# Patient Record
Sex: FEMALE | Race: WHITE | NOT HISPANIC OR LATINO
[De-identification: names, ages, dates, MRNs, and addresses within clinical notes are randomized per-mention and may not be internally consistent; named-entity substitution may affect disease eponyms.]

---

## 2017-07-10 ENCOUNTER — APPOINTMENT (OUTPATIENT)
Dept: INTERNAL MEDICINE | Facility: CLINIC | Age: 67
End: 2017-07-10

## 2017-07-10 VITALS
HEIGHT: 65 IN | SYSTOLIC BLOOD PRESSURE: 130 MMHG | TEMPERATURE: 98 F | OXYGEN SATURATION: 98 % | WEIGHT: 165 LBS | BODY MASS INDEX: 27.49 KG/M2 | DIASTOLIC BLOOD PRESSURE: 80 MMHG | HEART RATE: 63 BPM

## 2017-07-18 ENCOUNTER — FORM ENCOUNTER (OUTPATIENT)
Age: 67
End: 2017-07-18

## 2017-07-19 ENCOUNTER — OUTPATIENT (OUTPATIENT)
Dept: OUTPATIENT SERVICES | Facility: HOSPITAL | Age: 67
LOS: 1 days | End: 2017-07-19
Payer: MEDICARE

## 2017-07-19 ENCOUNTER — APPOINTMENT (OUTPATIENT)
Dept: ULTRASOUND IMAGING | Facility: IMAGING CENTER | Age: 67
End: 2017-07-19

## 2017-07-19 DIAGNOSIS — Z00.8 ENCOUNTER FOR OTHER GENERAL EXAMINATION: ICD-10-CM

## 2017-07-19 PROCEDURE — 76700 US EXAM ABDOM COMPLETE: CPT

## 2017-07-26 VITALS — DIASTOLIC BLOOD PRESSURE: 80 MMHG | SYSTOLIC BLOOD PRESSURE: 135 MMHG

## 2017-07-26 LAB
ALBUMIN SERPL ELPH-MCNC: 4.4 G/DL
ALP BLD-CCNC: 94 U/L
ALT SERPL-CCNC: 9 U/L
AMYLASE/CREAT SERPL: 70 U/L
ANION GAP SERPL CALC-SCNC: 17 MMOL/L
AST SERPL-CCNC: 19 U/L
BASOPHILS # BLD AUTO: 0.02 K/UL
BASOPHILS NFR BLD AUTO: 0.2 %
BILIRUB SERPL-MCNC: 0.3 MG/DL
BUN SERPL-MCNC: 18 MG/DL
CALCIUM SERPL-MCNC: 10 MG/DL
CHLORIDE SERPL-SCNC: 101 MMOL/L
CO2 SERPL-SCNC: 22 MMOL/L
CREAT SERPL-MCNC: 0.89 MG/DL
EOSINOPHIL # BLD AUTO: 0.1 K/UL
EOSINOPHIL NFR BLD AUTO: 1.2 %
GLUCOSE SERPL-MCNC: 81 MG/DL
HCT VFR BLD CALC: 45.2 %
HGB BLD-MCNC: 14.9 G/DL
IMM GRANULOCYTES NFR BLD AUTO: 0.1 %
LPL SERPL-CCNC: 84 U/L
LYMPHOCYTES # BLD AUTO: 1.88 K/UL
LYMPHOCYTES NFR BLD AUTO: 22.5 %
MAN DIFF?: NORMAL
MCHC RBC-ENTMCNC: 32 PG
MCHC RBC-ENTMCNC: 33 GM/DL
MCV RBC AUTO: 97 FL
MONOCYTES # BLD AUTO: 0.59 K/UL
MONOCYTES NFR BLD AUTO: 7.1 %
NEUTROPHILS # BLD AUTO: 5.75 K/UL
NEUTROPHILS NFR BLD AUTO: 68.9 %
PLATELET # BLD AUTO: 339 K/UL
POTASSIUM SERPL-SCNC: 4.9 MMOL/L
PROT SERPL-MCNC: 7.8 G/DL
RBC # BLD: 4.66 M/UL
RBC # FLD: 14.3 %
SODIUM SERPL-SCNC: 140 MMOL/L
WBC # FLD AUTO: 8.35 K/UL

## 2017-08-12 ENCOUNTER — RX RENEWAL (OUTPATIENT)
Age: 67
End: 2017-08-12

## 2017-09-07 ENCOUNTER — NON-APPOINTMENT (OUTPATIENT)
Age: 67
End: 2017-09-07

## 2017-09-07 ENCOUNTER — APPOINTMENT (OUTPATIENT)
Dept: INTERNAL MEDICINE | Facility: CLINIC | Age: 67
End: 2017-09-07
Payer: MEDICARE

## 2017-09-07 VITALS
WEIGHT: 160 LBS | HEART RATE: 99 BPM | TEMPERATURE: 98.3 F | OXYGEN SATURATION: 92 % | HEIGHT: 65 IN | BODY MASS INDEX: 26.66 KG/M2

## 2017-09-07 DIAGNOSIS — Z87.19 PERSONAL HISTORY OF OTHER DISEASES OF THE DIGESTIVE SYSTEM: ICD-10-CM

## 2017-09-07 DIAGNOSIS — M54.9 DORSALGIA, UNSPECIFIED: ICD-10-CM

## 2017-09-07 PROCEDURE — 90670 PCV13 VACCINE IM: CPT

## 2017-09-07 PROCEDURE — G0008: CPT

## 2017-09-07 PROCEDURE — 36415 COLL VENOUS BLD VENIPUNCTURE: CPT

## 2017-09-07 PROCEDURE — 93000 ELECTROCARDIOGRAM COMPLETE: CPT | Mod: 59

## 2017-09-07 PROCEDURE — 90662 IIV NO PRSV INCREASED AG IM: CPT

## 2017-09-07 PROCEDURE — G0009: CPT

## 2017-09-07 PROCEDURE — G0439: CPT

## 2017-09-07 RX ORDER — ESOMEPRAZOLE MAGNESIUM 40 MG/1
40 CAPSULE, DELAYED RELEASE ORAL DAILY
Qty: 30 | Refills: 3 | Status: DISCONTINUED | COMMUNITY
Start: 2017-07-10 | End: 2017-09-07

## 2017-10-05 ENCOUNTER — APPOINTMENT (OUTPATIENT)
Dept: INTERNAL MEDICINE | Facility: CLINIC | Age: 67
End: 2017-10-05
Payer: MEDICARE

## 2017-10-05 VITALS
DIASTOLIC BLOOD PRESSURE: 80 MMHG | BODY MASS INDEX: 26.66 KG/M2 | OXYGEN SATURATION: 97 % | HEART RATE: 74 BPM | TEMPERATURE: 98.5 F | HEIGHT: 65 IN | SYSTOLIC BLOOD PRESSURE: 120 MMHG | WEIGHT: 160 LBS

## 2017-10-05 LAB
25(OH)D3 SERPL-MCNC: 28.5 NG/ML
ALBUMIN SERPL ELPH-MCNC: 4.3 G/DL
ALP BLD-CCNC: 98 U/L
ALT SERPL-CCNC: 21 U/L
AMYLASE/CREAT SERPL: 61 U/L
ANION GAP SERPL CALC-SCNC: 19 MMOL/L
APPEARANCE: CLEAR
AST SERPL-CCNC: 32 U/L
BACTERIA: NEGATIVE
BASOPHILS # BLD AUTO: 0.03 K/UL
BASOPHILS NFR BLD AUTO: 0.4 %
BILIRUB SERPL-MCNC: 0.4 MG/DL
BILIRUBIN URINE: NEGATIVE
BLOOD URINE: NEGATIVE
BUN SERPL-MCNC: 14 MG/DL
CALCIUM SERPL-MCNC: 10.5 MG/DL
CHLORIDE SERPL-SCNC: 101 MMOL/L
CHOLEST SERPL-MCNC: 215 MG/DL
CHOLEST/HDLC SERPL: 3.4 RATIO
CO2 SERPL-SCNC: 21 MMOL/L
COLOR: ABNORMAL
CREAT SERPL-MCNC: 0.82 MG/DL
EOSINOPHIL # BLD AUTO: 0.09 K/UL
EOSINOPHIL NFR BLD AUTO: 1.2 %
GLUCOSE QUALITATIVE U: NORMAL MG/DL
GLUCOSE SERPL-MCNC: 101 MG/DL
GRANULAR CASTS: 5 /LPF
HBA1C MFR BLD HPLC: 5.6 %
HCT VFR BLD CALC: 46.1 %
HDLC SERPL-MCNC: 64 MG/DL
HGB BLD-MCNC: 15.2 G/DL
HYALINE CASTS: 3 /LPF
IMM GRANULOCYTES NFR BLD AUTO: 0.1 %
KETONES URINE: ABNORMAL
LDLC SERPL CALC-MCNC: 136 MG/DL
LEUKOCYTE ESTERASE URINE: NEGATIVE
LPL SERPL-CCNC: 70 U/L
LYMPHOCYTES # BLD AUTO: 2.48 K/UL
LYMPHOCYTES NFR BLD AUTO: 33.8 %
MAN DIFF?: NORMAL
MCHC RBC-ENTMCNC: 31.1 PG
MCHC RBC-ENTMCNC: 33 GM/DL
MCV RBC AUTO: 94.3 FL
MICROSCOPIC-UA: NORMAL
MONOCYTES # BLD AUTO: 0.5 K/UL
MONOCYTES NFR BLD AUTO: 6.8 %
NEUTROPHILS # BLD AUTO: 4.23 K/UL
NEUTROPHILS NFR BLD AUTO: 57.7 %
NITRITE URINE: NEGATIVE
PH URINE: 5.5
PLATELET # BLD AUTO: 371 K/UL
POTASSIUM SERPL-SCNC: 4.7 MMOL/L
PROT SERPL-MCNC: 8.3 G/DL
PROTEIN URINE: 30 MG/DL
RBC # BLD: 4.89 M/UL
RBC # FLD: 13.9 %
RED BLOOD CELLS URINE: 11 /HPF
SODIUM SERPL-SCNC: 141 MMOL/L
SPECIFIC GRAVITY URINE: 1.03
SQUAMOUS EPITHELIAL CELLS: 4 /HPF
T4 FREE SERPL-MCNC: 1 NG/DL
TRIGL SERPL-MCNC: 74 MG/DL
TSH SERPL-ACNC: 5.62 UIU/ML
UROBILINOGEN URINE: NORMAL MG/DL
WBC # FLD AUTO: 7.34 K/UL
WHITE BLOOD CELLS URINE: 4 /HPF

## 2017-10-05 PROCEDURE — 99214 OFFICE O/P EST MOD 30 MIN: CPT

## 2017-10-05 RX ORDER — ESCITALOPRAM OXALATE 5 MG/1
5 TABLET ORAL DAILY
Qty: 30 | Refills: 3 | Status: DISCONTINUED | COMMUNITY
Start: 2017-09-07 | End: 2017-10-05

## 2017-10-07 VITALS — SYSTOLIC BLOOD PRESSURE: 125 MMHG | DIASTOLIC BLOOD PRESSURE: 75 MMHG

## 2017-11-15 LAB
APPEARANCE: CLEAR
BACTERIA UR CULT: NORMAL
BACTERIA: NEGATIVE
BILIRUBIN URINE: NEGATIVE
BLOOD URINE: NEGATIVE
COLOR: YELLOW
GLUCOSE QUALITATIVE U: NEGATIVE MG/DL
HYALINE CASTS: 2 /LPF
KETONES URINE: ABNORMAL
LEUKOCYTE ESTERASE URINE: NEGATIVE
MICROSCOPIC-UA: NORMAL
NITRITE URINE: NEGATIVE
PH URINE: 5
PROTEIN URINE: NEGATIVE MG/DL
RED BLOOD CELLS URINE: 3 /HPF
SPECIFIC GRAVITY URINE: 1.03
SQUAMOUS EPITHELIAL CELLS: 1 /HPF
UROBILINOGEN URINE: NEGATIVE MG/DL
WHITE BLOOD CELLS URINE: 4 /HPF

## 2017-11-20 ENCOUNTER — APPOINTMENT (OUTPATIENT)
Dept: INTERNAL MEDICINE | Facility: CLINIC | Age: 67
End: 2017-11-20
Payer: MEDICARE

## 2017-11-20 VITALS
SYSTOLIC BLOOD PRESSURE: 120 MMHG | DIASTOLIC BLOOD PRESSURE: 84 MMHG | TEMPERATURE: 98.4 F | WEIGHT: 160 LBS | HEIGHT: 65 IN | HEART RATE: 72 BPM | OXYGEN SATURATION: 98 % | BODY MASS INDEX: 26.66 KG/M2

## 2017-11-20 PROCEDURE — 99214 OFFICE O/P EST MOD 30 MIN: CPT

## 2017-11-20 RX ORDER — BUPROPION HYDROCHLORIDE 150 MG/1
150 TABLET, EXTENDED RELEASE ORAL DAILY
Qty: 30 | Refills: 3 | Status: DISCONTINUED | COMMUNITY
Start: 2017-10-05 | End: 2017-11-20

## 2017-11-20 RX ORDER — CEPHALEXIN 500 MG/1
500 CAPSULE ORAL 3 TIMES DAILY
Qty: 21 | Refills: 0 | Status: DISCONTINUED | COMMUNITY
Start: 2017-10-05 | End: 2017-11-20

## 2018-06-08 ENCOUNTER — APPOINTMENT (OUTPATIENT)
Dept: INTERNAL MEDICINE | Facility: CLINIC | Age: 68
End: 2018-06-08
Payer: MEDICARE

## 2018-06-08 ENCOUNTER — NON-APPOINTMENT (OUTPATIENT)
Age: 68
End: 2018-06-08

## 2018-06-08 VITALS
WEIGHT: 161 LBS | OXYGEN SATURATION: 98 % | HEART RATE: 64 BPM | HEIGHT: 64.5 IN | TEMPERATURE: 98.1 F | BODY MASS INDEX: 27.15 KG/M2

## 2018-06-08 DIAGNOSIS — R06.00 DYSPNEA, UNSPECIFIED: ICD-10-CM

## 2018-06-08 LAB — S PYO AG SPEC QL IA: NORMAL

## 2018-06-08 PROCEDURE — 87880 STREP A ASSAY W/OPTIC: CPT | Mod: QW

## 2018-06-08 PROCEDURE — 99214 OFFICE O/P EST MOD 30 MIN: CPT | Mod: 25

## 2018-06-08 PROCEDURE — 93000 ELECTROCARDIOGRAM COMPLETE: CPT

## 2018-06-08 PROCEDURE — 36415 COLL VENOUS BLD VENIPUNCTURE: CPT

## 2018-06-09 VITALS — SYSTOLIC BLOOD PRESSURE: 135 MMHG | DIASTOLIC BLOOD PRESSURE: 85 MMHG

## 2018-06-09 NOTE — HISTORY OF PRESENT ILLNESS
[de-identified] : The patient has a sore throat for about two weeks, nasal congestion.  She was seeing her ENT for more chronic symptoms of burning tongue and throat symptoms.  She was referred to an allergist/immunologist, Dr. 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 (she received the polio vaccine there on 3/20/18.)  \par \par She can have dyspnea at rest but it is not related to exertion.  It occurs when resting at home.  No chest pain or pressure, no orthopnea or edema.  \par \par She also says she needs her thyroid checked.

## 2018-06-09 NOTE — ASSESSMENT
[FreeTextEntry1] : The patient has a few issues.  She seems to have an acute URI now with nasal and throat symptoms.  She has no fever and is nontoxic.  A rapid strep test is negative. It is likely viral.  Hold on antibiotics and monitor.\par \par She has more chronic symptoms which were discussed.  She is seeing an allergist/immunologist.  I asked the patient to have those records sent for review.\par \par Check TFTs on Levothyroxine.  \par \par She has dyspnea at rest which is probably from anxiety. Note she is very anxious today in the office.  However, she was advised to see a cardiologist to be thorough and rule out a cardiac cause.\par \par The BP is a little elevated at 135/85.  We discussed increasing her medication but she declines and says the BP is better at home.  Note also could consider if the Ramipril could be causing her throat symptoms.  I suggested changing medications but she doesn't want to do that for now.  Will discuss next time.  \par \par She didn't come to recheck the urine which had been advised. Check a U/A today with a cytology and consider a  evaluation.

## 2018-06-09 NOTE — PHYSICAL EXAM
[Normal Outer Ear/Nose] : the outer ears and nose were normal in appearance [Normal Oropharynx] : the oropharynx was normal [Normal TMs] : both tympanic membranes were normal [No JVD] : no jugular venous distention [Supple] : supple [No Lymphadenopathy] : no lymphadenopathy [No Respiratory Distress] : no respiratory distress  [Clear to Auscultation] : lungs were clear to auscultation bilaterally [No Accessory Muscle Use] : no accessory muscle use [Normal Rate] : normal rate  [Regular Rhythm] : with a regular rhythm [Normal S1, S2] : normal S1 and S2 [Pedal Pulses Present] : the pedal pulses are present [No Edema] : there was no peripheral edema [Soft] : abdomen soft [Non Tender] : non-tender [Non-distended] : non-distended [No HSM] : no HSM [Normal Gait] : normal gait [No Focal Deficits] : no focal deficits

## 2018-06-15 LAB
ALBUMIN SERPL ELPH-MCNC: 4.4 G/DL
ALP BLD-CCNC: 100 U/L
ALT SERPL-CCNC: 21 U/L
ANION GAP SERPL CALC-SCNC: 15 MMOL/L
APPEARANCE: CLEAR
AST SERPL-CCNC: 31 U/L
BACTERIA: NEGATIVE
BASOPHILS # BLD AUTO: 0.03 K/UL
BASOPHILS NFR BLD AUTO: 0.5 %
BILIRUB SERPL-MCNC: 0.4 MG/DL
BILIRUBIN URINE: NEGATIVE
BLOOD URINE: NEGATIVE
BUN SERPL-MCNC: 17 MG/DL
CALCIUM SERPL-MCNC: 9.5 MG/DL
CHLORIDE SERPL-SCNC: 101 MMOL/L
CHOLEST SERPL-MCNC: 209 MG/DL
CHOLEST/HDLC SERPL: 3.7 RATIO
CO2 SERPL-SCNC: 24 MMOL/L
COLOR: YELLOW
CORE LAB FLUID CYTOLOGY: NORMAL
CREAT SERPL-MCNC: 0.92 MG/DL
EOSINOPHIL # BLD AUTO: 0.11 K/UL
EOSINOPHIL NFR BLD AUTO: 1.7 %
GLUCOSE QUALITATIVE U: NEGATIVE MG/DL
GLUCOSE SERPL-MCNC: 81 MG/DL
HCT VFR BLD CALC: 46 %
HDLC SERPL-MCNC: 57 MG/DL
HGB BLD-MCNC: 14.8 G/DL
HYALINE CASTS: 9 /LPF
IMM GRANULOCYTES NFR BLD AUTO: 0.2 %
KETONES URINE: NEGATIVE
LDLC SERPL CALC-MCNC: 126 MG/DL
LEUKOCYTE ESTERASE URINE: NEGATIVE
LYMPHOCYTES # BLD AUTO: 2.05 K/UL
LYMPHOCYTES NFR BLD AUTO: 31.2 %
MAN DIFF?: NORMAL
MCHC RBC-ENTMCNC: 31.6 PG
MCHC RBC-ENTMCNC: 32.2 GM/DL
MCV RBC AUTO: 98.1 FL
MICROSCOPIC-UA: NORMAL
MONOCYTES # BLD AUTO: 0.61 K/UL
MONOCYTES NFR BLD AUTO: 9.3 %
NEUTROPHILS # BLD AUTO: 3.77 K/UL
NEUTROPHILS NFR BLD AUTO: 57.1 %
NITRITE URINE: NEGATIVE
PH URINE: 5
PLATELET # BLD AUTO: 317 K/UL
POTASSIUM SERPL-SCNC: 5 MMOL/L
PROT SERPL-MCNC: 7.8 G/DL
PROTEIN URINE: NEGATIVE MG/DL
RBC # BLD: 4.69 M/UL
RBC # FLD: 14.2 %
RED BLOOD CELLS URINE: 3 /HPF
SODIUM SERPL-SCNC: 140 MMOL/L
SPECIFIC GRAVITY URINE: 1.02
SQUAMOUS EPITHELIAL CELLS: 1 /HPF
T4 FREE SERPL-MCNC: 1.3 NG/DL
TRIGL SERPL-MCNC: 131 MG/DL
TSH SERPL-ACNC: 4.22 UIU/ML
UROBILINOGEN URINE: NEGATIVE MG/DL
WBC # FLD AUTO: 6.58 K/UL
WHITE BLOOD CELLS URINE: 1 /HPF

## 2018-07-19 ENCOUNTER — APPOINTMENT (OUTPATIENT)
Dept: UROLOGY | Facility: CLINIC | Age: 68
End: 2018-07-19
Payer: MEDICARE

## 2018-07-19 PROCEDURE — 99204 OFFICE O/P NEW MOD 45 MIN: CPT

## 2018-07-27 ENCOUNTER — APPOINTMENT (OUTPATIENT)
Dept: CT IMAGING | Facility: IMAGING CENTER | Age: 68
End: 2018-07-27
Payer: MEDICARE

## 2018-07-27 ENCOUNTER — OUTPATIENT (OUTPATIENT)
Dept: OUTPATIENT SERVICES | Facility: HOSPITAL | Age: 68
LOS: 1 days | End: 2018-07-27
Payer: MEDICARE

## 2018-07-27 DIAGNOSIS — R31.29 OTHER MICROSCOPIC HEMATURIA: ICD-10-CM

## 2018-07-27 DIAGNOSIS — R89.6 ABNORMAL CYTOLOGICAL FINDINGS IN SPECIMENS FROM OTHER ORGANS, SYSTEMS AND TISSUES: ICD-10-CM

## 2018-07-28 ENCOUNTER — MOBILE ON CALL (OUTPATIENT)
Age: 68
End: 2018-07-28

## 2018-07-29 ENCOUNTER — FORM ENCOUNTER (OUTPATIENT)
Age: 68
End: 2018-07-29

## 2018-07-30 PROCEDURE — 82565 ASSAY OF CREATININE: CPT

## 2018-07-30 PROCEDURE — 74178 CT ABD&PLV WO CNTR FLWD CNTR: CPT | Mod: 26

## 2018-07-30 PROCEDURE — 74178 CT ABD&PLV WO CNTR FLWD CNTR: CPT

## 2018-08-02 ENCOUNTER — APPOINTMENT (OUTPATIENT)
Dept: UROLOGY | Facility: CLINIC | Age: 68
End: 2018-08-02
Payer: MEDICARE

## 2018-08-02 VITALS — DIASTOLIC BLOOD PRESSURE: 88 MMHG | SYSTOLIC BLOOD PRESSURE: 130 MMHG

## 2018-08-02 DIAGNOSIS — N76.6 ULCERATION OF VULVA: ICD-10-CM

## 2018-08-02 DIAGNOSIS — N94.10 UNSPECIFIED DYSPAREUNIA: ICD-10-CM

## 2018-08-02 PROCEDURE — 51798 US URINE CAPACITY MEASURE: CPT

## 2018-08-02 PROCEDURE — 99215 OFFICE O/P EST HI 40 MIN: CPT | Mod: 25

## 2018-08-03 ENCOUNTER — APPOINTMENT (OUTPATIENT)
Dept: INTERNAL MEDICINE | Facility: CLINIC | Age: 68
End: 2018-08-03
Payer: MEDICARE

## 2018-08-03 VITALS
WEIGHT: 164 LBS | OXYGEN SATURATION: 98 % | DIASTOLIC BLOOD PRESSURE: 90 MMHG | HEART RATE: 67 BPM | BODY MASS INDEX: 27.66 KG/M2 | TEMPERATURE: 98.2 F | SYSTOLIC BLOOD PRESSURE: 130 MMHG | HEIGHT: 64.5 IN

## 2018-08-03 LAB
HSV+VZV DNA SPEC QL NAA+PROBE: NOT DETECTED
SPECIMEN SOURCE: NORMAL

## 2018-08-03 PROCEDURE — 99214 OFFICE O/P EST MOD 30 MIN: CPT

## 2018-08-03 RX ORDER — AMOXICILLIN AND CLAVULANATE POTASSIUM 875; 125 MG/1; MG/1
875-125 TABLET, COATED ORAL TWICE DAILY
Qty: 14 | Refills: 0 | Status: DISCONTINUED | COMMUNITY
Start: 2018-06-15 | End: 2018-08-03

## 2018-08-05 VITALS — SYSTOLIC BLOOD PRESSURE: 145 MMHG | DIASTOLIC BLOOD PRESSURE: 95 MMHG

## 2018-08-05 NOTE — PHYSICAL EXAM
[No Acute Distress] : no acute distress [Well Nourished] : well nourished [Well Developed] : well developed [No Respiratory Distress] : no respiratory distress  [Clear to Auscultation] : lungs were clear to auscultation bilaterally [No Accessory Muscle Use] : no accessory muscle use [Normal Rate] : normal rate  [Regular Rhythm] : with a regular rhythm [Normal S1, S2] : normal S1 and S2 [Pedal Pulses Present] : the pedal pulses are present [No Edema] : there was no peripheral edema [de-identified] : small area of redness and warmth left leg

## 2018-08-05 NOTE — HISTORY OF PRESENT ILLNESS
[de-identified] : The patient comes in to check the blood pressure.  She has had it checked elsewhere and it was 137/88, 132/86.  She remains under a lot of stress.\par \par She also has an area of redness of the left leg which is tender and warm. \par \par She saw a urologist, Dr. Bolanos.  The polyoma seen on the urine cytology does not need to be treated.  She had a CT A/P which was unremarkable.  A cystoscopy is planned for next week.  She denies urinary symptoms.\par \par She was found to have  a vaginal lesion and she is going for a biopsy for that.

## 2018-08-05 NOTE — ASSESSMENT
[FreeTextEntry1] : The blood pressure is elevated. There is an anxiety component but it needs to be treated.  She has intermittent throat symptoms and Ramipril as the culprit has been considered.   Will D/C ramipril and start Losartan 50 mg QD.  Return to check the BP in a month.  \par \par She has a mild left leg cellulitis.  Treat with Cephalexin.\par \par We discussed the urology evaluation as above.  Cystoscopy is planned.  CT A/P unremarkable.

## 2018-08-09 ENCOUNTER — APPOINTMENT (OUTPATIENT)
Dept: UROLOGY | Facility: CLINIC | Age: 68
End: 2018-08-09
Payer: MEDICARE

## 2018-08-09 VITALS — SYSTOLIC BLOOD PRESSURE: 120 MMHG | DIASTOLIC BLOOD PRESSURE: 70 MMHG

## 2018-08-09 PROCEDURE — 52000 CYSTOURETHROSCOPY: CPT

## 2018-08-09 PROCEDURE — 99213 OFFICE O/P EST LOW 20 MIN: CPT | Mod: 25

## 2018-08-24 ENCOUNTER — LABORATORY RESULT (OUTPATIENT)
Age: 68
End: 2018-08-24

## 2018-08-24 ENCOUNTER — APPOINTMENT (OUTPATIENT)
Dept: UROLOGY | Facility: CLINIC | Age: 68
End: 2018-08-24
Payer: MEDICARE

## 2018-08-24 VITALS — DIASTOLIC BLOOD PRESSURE: 64 MMHG | SYSTOLIC BLOOD PRESSURE: 110 MMHG

## 2018-08-24 DIAGNOSIS — N90.89 OTHER SPECIFIED NONINFLAMMATORY DISORDERS OF VULVA AND PERINEUM: ICD-10-CM

## 2018-08-24 PROCEDURE — 56821 COLPOSCOPY VULVA W/BIOPSY: CPT

## 2018-08-24 PROCEDURE — 99215 OFFICE O/P EST HI 40 MIN: CPT | Mod: 25

## 2018-08-28 ENCOUNTER — OUTPATIENT (OUTPATIENT)
Dept: OUTPATIENT SERVICES | Facility: HOSPITAL | Age: 68
LOS: 1 days | End: 2018-08-28
Payer: MEDICARE

## 2018-08-28 VITALS
HEART RATE: 67 BPM | DIASTOLIC BLOOD PRESSURE: 78 MMHG | SYSTOLIC BLOOD PRESSURE: 138 MMHG | WEIGHT: 164.02 LBS | HEIGHT: 64.5 IN | TEMPERATURE: 98 F | RESPIRATION RATE: 16 BRPM | OXYGEN SATURATION: 97 %

## 2018-08-28 DIAGNOSIS — Z90.49 ACQUIRED ABSENCE OF OTHER SPECIFIED PARTS OF DIGESTIVE TRACT: Chronic | ICD-10-CM

## 2018-08-28 DIAGNOSIS — N90.89 OTHER SPECIFIED NONINFLAMMATORY DISORDERS OF VULVA AND PERINEUM: Chronic | ICD-10-CM

## 2018-08-28 DIAGNOSIS — D49.4 NEOPLASM OF UNSPECIFIED BEHAVIOR OF BLADDER: ICD-10-CM

## 2018-08-28 DIAGNOSIS — Z98.890 OTHER SPECIFIED POSTPROCEDURAL STATES: Chronic | ICD-10-CM

## 2018-08-28 DIAGNOSIS — N32.89 OTHER SPECIFIED DISORDERS OF BLADDER: Chronic | ICD-10-CM

## 2018-08-28 DIAGNOSIS — Z01.818 ENCOUNTER FOR OTHER PREPROCEDURAL EXAMINATION: ICD-10-CM

## 2018-08-28 DIAGNOSIS — G89.29 OTHER CHRONIC PAIN: ICD-10-CM

## 2018-08-28 DIAGNOSIS — Z86.018 PERSONAL HISTORY OF OTHER BENIGN NEOPLASM: Chronic | ICD-10-CM

## 2018-08-28 LAB
ANION GAP SERPL CALC-SCNC: 12 MMOL/L — SIGNIFICANT CHANGE UP (ref 5–17)
BUN SERPL-MCNC: 23 MG/DL — SIGNIFICANT CHANGE UP (ref 7–23)
CALCIUM SERPL-MCNC: 9.7 MG/DL — SIGNIFICANT CHANGE UP (ref 8.4–10.5)
CHLORIDE SERPL-SCNC: 96 MMOL/L — SIGNIFICANT CHANGE UP (ref 96–108)
CO2 SERPL-SCNC: 28 MMOL/L — SIGNIFICANT CHANGE UP (ref 22–31)
CREAT SERPL-MCNC: 0.82 MG/DL — SIGNIFICANT CHANGE UP (ref 0.5–1.3)
GLUCOSE SERPL-MCNC: 94 MG/DL — SIGNIFICANT CHANGE UP (ref 70–99)
HCT VFR BLD CALC: 43.6 % — SIGNIFICANT CHANGE UP (ref 34.5–45)
HGB BLD-MCNC: 14.5 G/DL — SIGNIFICANT CHANGE UP (ref 11.5–15.5)
MCHC RBC-ENTMCNC: 31.1 PG — SIGNIFICANT CHANGE UP (ref 27–34)
MCHC RBC-ENTMCNC: 33.3 GM/DL — SIGNIFICANT CHANGE UP (ref 32–36)
MCV RBC AUTO: 93.6 FL — SIGNIFICANT CHANGE UP (ref 80–100)
PLATELET # BLD AUTO: 277 K/UL — SIGNIFICANT CHANGE UP (ref 150–400)
POTASSIUM SERPL-MCNC: 4.7 MMOL/L — SIGNIFICANT CHANGE UP (ref 3.5–5.3)
POTASSIUM SERPL-SCNC: 4.7 MMOL/L — SIGNIFICANT CHANGE UP (ref 3.5–5.3)
RBC # BLD: 4.66 M/UL — SIGNIFICANT CHANGE UP (ref 3.8–5.2)
RBC # FLD: 13.3 % — SIGNIFICANT CHANGE UP (ref 10.3–14.5)
SODIUM SERPL-SCNC: 136 MMOL/L — SIGNIFICANT CHANGE UP (ref 135–145)
WBC # BLD: 7.46 K/UL — SIGNIFICANT CHANGE UP (ref 3.8–10.5)
WBC # FLD AUTO: 7.46 K/UL — SIGNIFICANT CHANGE UP (ref 3.8–10.5)

## 2018-08-28 PROCEDURE — 85027 COMPLETE CBC AUTOMATED: CPT

## 2018-08-28 PROCEDURE — 80048 BASIC METABOLIC PNL TOTAL CA: CPT

## 2018-08-28 PROCEDURE — 87086 URINE CULTURE/COLONY COUNT: CPT

## 2018-08-28 PROCEDURE — G0463: CPT

## 2018-08-28 RX ORDER — CEFAZOLIN SODIUM 1 G
2000 VIAL (EA) INJECTION ONCE
Qty: 0 | Refills: 0 | Status: DISCONTINUED | OUTPATIENT
Start: 2018-09-04 | End: 2018-09-19

## 2018-08-28 NOTE — H&P PST ADULT - NEGATIVE CARDIOVASCULAR SYMPTOMS
no palpitations/no dyspnea on exertion/no orthopnea/no paroxysmal nocturnal dyspnea/no chest pain no peripheral edema/no orthopnea/no paroxysmal nocturnal dyspnea/no palpitations/no dyspnea on exertion/no chest pain

## 2018-08-28 NOTE — H&P PST ADULT - OTHER CARE PROVIDERS
Dr. Delfin Judd Immunologist , Pain management Dr. Adrien Vitale , spinal surgeon ( no recent visits)

## 2018-08-28 NOTE — H&P PST ADULT - RS GEN PE MLT RESP DETAILS PC
good air movement/respirations non-labored/airway patent/clear to auscultation bilaterally/breath sounds equal

## 2018-08-28 NOTE — H&P PST ADULT - NSANTHOSAYNRD_GEN_A_CORE
No. RAO screening performed.  STOP BANG Legend: 0-2 = LOW Risk; 3-4 = INTERMEDIATE Risk; 5-8 = HIGH Risk

## 2018-08-28 NOTE — H&P PST ADULT - LOCATION OF BACK PAIN
chronic/cervical spine/thoracic spine/lumbar spine lumbar spine/cervical spine/thoracic spine/chronic pain

## 2018-08-28 NOTE — H&P PST ADULT - PSH
H/O lipoma  removal  H/O: knee surgery  2006  Irritation of vulva  s/p biopsy 8/24/18  S/P D&C (status post dilation and curettage)  1980s  S/P shoulder surgery H/O lipoma  removal  H/O: knee surgery  2006  History of parotid gland removal  2005  Irritation of vulva  s/p biopsy 8/24/18  S/P D&C (status post dilation and curettage)  multiple,1980s  S/P shoulder surgery  2008 H/O lipoma  removal, multiple  H/O: knee surgery  2006  History of parotid gland removal  2005  Irritation of vulva  s/p biopsy 8/24/18  S/P D&C (status post dilation and curettage)  multiple,1980s  S/P shoulder surgery  2008
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
## 2018-08-28 NOTE — H&P PST ADULT - NEGATIVE GASTROINTESTINAL SYMPTOMS
no nausea/no hematochezia/no melena/no change in bowel habits/no vomiting/no abdominal pain no melena/no nausea/no abdominal pain/no vomiting/no hematochezia

## 2018-08-28 NOTE — H&P PST ADULT - ANESTHESIA, PREVIOUS REACTION, PROFILE
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  2007 patient had Propofol for colonoscopy and had delayed awakening, denies any need for hospital stay. 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  2007 patient had Propofol for colonoscopy and had delayed awakening, denies any need for hospital stay. or ER visit, denies any other complications with any other surgery.

## 2018-08-29 ENCOUNTER — APPOINTMENT (OUTPATIENT)
Dept: INTERNAL MEDICINE | Facility: CLINIC | Age: 68
End: 2018-08-29
Payer: MEDICARE

## 2018-08-29 VITALS
HEIGHT: 64.5 IN | OXYGEN SATURATION: 97 % | HEART RATE: 67 BPM | DIASTOLIC BLOOD PRESSURE: 84 MMHG | BODY MASS INDEX: 27.74 KG/M2 | WEIGHT: 164.5 LBS | SYSTOLIC BLOOD PRESSURE: 150 MMHG | TEMPERATURE: 98.4 F

## 2018-08-29 VITALS — DIASTOLIC BLOOD PRESSURE: 90 MMHG | SYSTOLIC BLOOD PRESSURE: 144 MMHG
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
## 2018-08-29 PROBLEM — N90.89 OTHER SPECIFIED NONINFLAMMATORY DISORDERS OF VULVA AND PERINEUM: Chronic | Status: ACTIVE | Noted: 2018-08-28
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
## 2018-08-29 LAB
CULTURE RESULTS: SIGNIFICANT CHANGE UP
SPECIMEN SOURCE: SIGNIFICANT CHANGE UP

## 2018-08-29 PROCEDURE — 99214 OFFICE O/P EST MOD 30 MIN: CPT

## 2018-08-29 RX ORDER — CEPHALEXIN 500 MG/1
500 CAPSULE ORAL 3 TIMES DAILY
Qty: 30 | Refills: 0 | Status: DISCONTINUED | COMMUNITY
Start: 2018-08-03 | End: 2018-08-29

## 2018-08-29 NOTE — ASSESSMENT
[Patient Optimized for Surgery] : Patient optimized for surgery [No Further Testing Recommended] : no further testing recommended [Modify medications prior to procedure] : Modify medications prior to procedure [As per surgery] : as per surgery [FreeTextEntry4] : Low cardiac risk for a low risk surgical procedure. No medical contraindications for planned procedure. Preoperative blood work on 8/28 was unremarkable. She had a normal EKG in 6/2018. She may continue her typical medications on morning of procedure except will hold the Tramadol. She will take her blood pressure medication on morning of procedure.  [FreeTextEntry7] : No NSAID's now and until date of procedure. She will hold Tramadol on morning of procedure.

## 2018-08-29 NOTE — PHYSICAL EXAM
[No Acute Distress] : no acute distress [Well Nourished] : well nourished [Well Developed] : well developed [Well-Appearing] : well-appearing [Normal Oropharynx] : the oropharynx was normal [Normal TMs] : both tympanic membranes were normal [Supple] : supple [No Lymphadenopathy] : no lymphadenopathy [No Respiratory Distress] : no respiratory distress  [Clear to Auscultation] : lungs were clear to auscultation bilaterally [Normal Rate] : normal rate  [Regular Rhythm] : with a regular rhythm [Normal S1, S2] : normal S1 and S2 [No Murmur] : no murmur heard [No Edema] : there was no peripheral edema [Soft] : abdomen soft [Non Tender] : non-tender [Non-distended] : non-distended [Normal Bowel Sounds] : normal bowel sounds [No Rash] : no rash [Normal Gait] : normal gait [Normal Affect] : the affect was normal [Normal Insight/Judgement] : insight and judgment were intact [de-identified] : anxious mood

## 2018-08-29 NOTE — REVIEW OF SYSTEMS
[Frequency] : frequency [Negative] : Heme/Lymph [Dysuria] : no dysuria [Incontinence] : no incontinence [Hematuria] : no hematuria [Vaginal Discharge] : no vaginal discharge [FreeTextEntry8] : suprapubic pain with voiding

## 2018-08-29 NOTE — RESULTS/DATA
[] : results reviewed [de-identified] : 8/28/18: normal [de-identified] : 8:28/18: normal [de-identified] : 6/2018: NSR @ 62, low voltage, no ST-T abnormalities

## 2018-08-30 ENCOUNTER — RESULT REVIEW (OUTPATIENT)
Age: 68
End: 2018-08-30

## 2018-08-30 PROBLEM — R01.1 CARDIAC MURMUR, UNSPECIFIED: Chronic | Status: ACTIVE | Noted: 2018-08-28

## 2018-08-30 PROBLEM — M19.90 UNSPECIFIED OSTEOARTHRITIS, UNSPECIFIED SITE: Chronic | Status: ACTIVE | Noted: 2018-08-28

## 2018-08-30 PROBLEM — M48.00 SPINAL STENOSIS, SITE UNSPECIFIED: Chronic | Status: ACTIVE | Noted: 2018-08-28

## 2018-08-30 PROBLEM — M81.0 AGE-RELATED OSTEOPOROSIS WITHOUT CURRENT PATHOLOGICAL FRACTURE: Chronic | Status: ACTIVE | Noted: 2018-08-28

## 2018-08-30 PROBLEM — M41.9 SCOLIOSIS, UNSPECIFIED: Chronic | Status: ACTIVE | Noted: 2018-08-28

## 2018-08-30 PROBLEM — G89.29 OTHER CHRONIC PAIN: Chronic | Status: ACTIVE | Noted: 2018-08-28

## 2018-08-30 PROBLEM — M51.16 INTERVERTEBRAL DISC DISORDERS WITH RADICULOPATHY, LUMBAR REGION: Chronic | Status: ACTIVE | Noted: 2018-08-28

## 2018-08-30 PROBLEM — D80.6 ANTIBODY DEFICIENCY WITH NEAR-NORMAL IMMUNOGLOBULINS OR WITH HYPERIMMUNOGLOBULINEMIA: Chronic | Status: ACTIVE | Noted: 2018-08-28

## 2018-08-30 PROBLEM — K21.9 GASTRO-ESOPHAGEAL REFLUX DISEASE WITHOUT ESOPHAGITIS: Chronic | Status: ACTIVE | Noted: 2018-08-28

## 2018-08-30 PROBLEM — I83.90 ASYMPTOMATIC VARICOSE VEINS OF UNSPECIFIED LOWER EXTREMITY: Chronic | Status: ACTIVE | Noted: 2018-08-28

## 2018-08-30 PROBLEM — E03.9 HYPOTHYROIDISM, UNSPECIFIED: Chronic | Status: ACTIVE | Noted: 2018-08-28

## 2018-08-30 PROBLEM — D49.4 NEOPLASM OF UNSPECIFIED BEHAVIOR OF BLADDER: Chronic | Status: ACTIVE | Noted: 2018-08-28

## 2018-08-30 PROBLEM — M79.7 FIBROMYALGIA: Chronic | Status: ACTIVE | Noted: 2018-08-28

## 2018-08-30 PROBLEM — I10 ESSENTIAL (PRIMARY) HYPERTENSION: Chronic | Status: ACTIVE | Noted: 2018-08-28

## 2018-08-30 PROBLEM — M50.20 OTHER CERVICAL DISC DISPLACEMENT, UNSPECIFIED CERVICAL REGION: Chronic | Status: ACTIVE | Noted: 2018-08-28

## 2018-09-03 ENCOUNTER — TRANSCRIPTION ENCOUNTER (OUTPATIENT)
Age: 68
End: 2018-09-03

## 2018-09-04 ENCOUNTER — OUTPATIENT (OUTPATIENT)
Dept: OUTPATIENT SERVICES | Facility: HOSPITAL | Age: 68
LOS: 1 days | End: 2018-09-04
Payer: MEDICARE

## 2018-09-04 ENCOUNTER — RESULT REVIEW (OUTPATIENT)
Age: 68
End: 2018-09-04

## 2018-09-04 ENCOUNTER — APPOINTMENT (OUTPATIENT)
Dept: UROLOGY | Facility: HOSPITAL | Age: 68
End: 2018-09-04

## 2018-09-04 VITALS
OXYGEN SATURATION: 98 % | DIASTOLIC BLOOD PRESSURE: 62 MMHG | TEMPERATURE: 98 F | SYSTOLIC BLOOD PRESSURE: 111 MMHG | RESPIRATION RATE: 18 BRPM | HEART RATE: 68 BPM

## 2018-09-04 VITALS
SYSTOLIC BLOOD PRESSURE: 129 MMHG | DIASTOLIC BLOOD PRESSURE: 80 MMHG | HEIGHT: 64.5 IN | TEMPERATURE: 98 F | HEART RATE: 62 BPM | OXYGEN SATURATION: 99 % | WEIGHT: 164.02 LBS | RESPIRATION RATE: 20 BRPM

## 2018-09-04 DIAGNOSIS — D49.4 NEOPLASM OF UNSPECIFIED BEHAVIOR OF BLADDER: ICD-10-CM

## 2018-09-04 DIAGNOSIS — Z98.890 OTHER SPECIFIED POSTPROCEDURAL STATES: Chronic | ICD-10-CM

## 2018-09-04 DIAGNOSIS — N90.89 OTHER SPECIFIED NONINFLAMMATORY DISORDERS OF VULVA AND PERINEUM: Chronic | ICD-10-CM

## 2018-09-04 DIAGNOSIS — Z90.49 ACQUIRED ABSENCE OF OTHER SPECIFIED PARTS OF DIGESTIVE TRACT: Chronic | ICD-10-CM

## 2018-09-04 DIAGNOSIS — Z86.018 PERSONAL HISTORY OF OTHER BENIGN NEOPLASM: Chronic | ICD-10-CM

## 2018-09-04 PROCEDURE — 52224 CYSTOSCOPY AND TREATMENT: CPT

## 2018-09-04 PROCEDURE — 88305 TISSUE EXAM BY PATHOLOGIST: CPT

## 2018-09-04 PROCEDURE — C1769: CPT

## 2018-09-04 PROCEDURE — C1758: CPT

## 2018-09-04 PROCEDURE — 88305 TISSUE EXAM BY PATHOLOGIST: CPT | Mod: 26

## 2018-09-04 RX ORDER — CHOLECALCIFEROL (VITAMIN D3) 125 MCG
0 CAPSULE ORAL
Qty: 0 | Refills: 0 | COMMUNITY

## 2018-09-04 RX ORDER — OXYCODONE HYDROCHLORIDE 5 MG/1
5 TABLET ORAL ONCE
Qty: 0 | Refills: 0 | Status: DISCONTINUED | OUTPATIENT
Start: 2018-09-04 | End: 2018-09-04

## 2018-09-04 RX ORDER — LOSARTAN POTASSIUM 100 MG/1
1 TABLET, FILM COATED ORAL
Qty: 0 | Refills: 0 | COMMUNITY

## 2018-09-04 RX ORDER — OXYCODONE HYDROCHLORIDE 5 MG/1
10 TABLET ORAL ONCE
Qty: 0 | Refills: 0 | Status: DISCONTINUED | OUTPATIENT
Start: 2018-09-04 | End: 2018-09-04

## 2018-09-04 RX ORDER — GABAPENTIN 400 MG/1
0 CAPSULE ORAL
Qty: 0 | Refills: 0 | COMMUNITY

## 2018-09-04 RX ORDER — ONDANSETRON 8 MG/1
4 TABLET, FILM COATED ORAL ONCE
Qty: 0 | Refills: 0 | Status: DISCONTINUED | OUTPATIENT
Start: 2018-09-04 | End: 2018-09-04

## 2018-09-04 RX ORDER — SODIUM CHLORIDE 9 MG/ML
3 INJECTION INTRAMUSCULAR; INTRAVENOUS; SUBCUTANEOUS EVERY 8 HOURS
Qty: 0 | Refills: 0 | Status: DISCONTINUED | OUTPATIENT
Start: 2018-09-04 | End: 2018-09-04

## 2018-09-04 RX ORDER — SODIUM CHLORIDE 9 MG/ML
1000 INJECTION, SOLUTION INTRAVENOUS
Qty: 0 | Refills: 0 | Status: DISCONTINUED | OUTPATIENT
Start: 2018-09-04 | End: 2018-09-19

## 2018-09-04 RX ORDER — ONDANSETRON 8 MG/1
4 TABLET, FILM COATED ORAL ONCE
Qty: 0 | Refills: 0 | Status: DISCONTINUED | OUTPATIENT
Start: 2018-09-04 | End: 2018-09-19

## 2018-09-04 RX ORDER — TRAMADOL HYDROCHLORIDE 50 MG/1
0 TABLET ORAL
Qty: 0 | Refills: 0 | COMMUNITY

## 2018-09-04 RX ORDER — LIDOCAINE HCL 20 MG/ML
0.2 VIAL (ML) INJECTION ONCE
Qty: 0 | Refills: 0 | Status: DISCONTINUED | OUTPATIENT
Start: 2018-09-04 | End: 2018-09-04

## 2018-09-04 RX ORDER — CARISOPRODOL 250 MG
0 TABLET ORAL
Qty: 0 | Refills: 0 | COMMUNITY

## 2018-09-04 RX ORDER — GABAPENTIN 400 MG/1
300 CAPSULE ORAL ONCE
Qty: 0 | Refills: 0 | Status: DISCONTINUED | OUTPATIENT
Start: 2018-09-04 | End: 2018-09-19

## 2018-09-04 RX ORDER — CEPHALEXIN 500 MG
1 CAPSULE ORAL
Qty: 0 | Refills: 0 | COMMUNITY

## 2018-09-04 RX ORDER — TRAMADOL HYDROCHLORIDE 50 MG/1
1 TABLET ORAL
Qty: 0 | Refills: 0 | COMMUNITY

## 2018-09-04 RX ORDER — LEVOTHYROXINE SODIUM 125 MCG
1 TABLET ORAL
Qty: 0 | Refills: 0 | COMMUNITY

## 2018-09-04 NOTE — ASU DISCHARGE PLAN (ADULT/PEDIATRIC). - MEDICATION SUMMARY - MEDICATIONS TO TAKE
I will START or STAY ON the medications listed below when I get home from the hospital:    traMADol 200 mg/24 hours oral tablet, extended release  -- 1 tab(s) by mouth once a day  -- Indication: For home med    traMADol 50 mg oral tablet  -- orally every 8 hours, As Needed  -- Indication: For home med    losartan 50 mg oral tablet  -- 1 tab(s) by mouth once a day  -- Indication: For home med    gabapentin 300 mg oral capsule  -- orally 2 times a day, As Needed  -- Indication: For home med    Soma 350 mg oral tablet  -- orally 2 times a day, As Needed  -- Indication: For home med    levothyroxine 25 mcg (0.025 mg) oral tablet  -- 1 tab(s) by mouth once a day  -- Indication: For home med    multivitamin  -- orally once a day  -- Indication: For home med    Calcium 600+D  -- orally once a day  -- Indication: For home med    Vitamin D3  -- orally once a day  -- Indication: For home med

## 2018-09-04 NOTE — BRIEF OPERATIVE NOTE - OPERATION/FINDINGS
bipolar TURBT of ?tumor vs trigonitis around L UO monopolar TURBT of ?tumor vs trigonitis around L UO

## 2018-09-04 NOTE — ASU DISCHARGE PLAN (ADULT/PEDIATRIC). - INSTRUCTIONS
Please drink plenty of fluids.  it is normal to intermittently have blood in the urine after this procedure.

## 2018-09-04 NOTE — ASU DISCHARGE PLAN (ADULT/PEDIATRIC). - MEDICATION SUMMARY - MEDICATIONS TO STOP TAKING
I will STOP taking the medications listed below when I get home from the hospital:    Keflex 500 mg oral capsule  -- 1 cap(s) by mouth every 12 hours x 5 days, last dose 8/28/18 for prophylaxis for s/p vular biopsy at Dr. Bolanos's office    Percocet 10/325 oral tablet  -- 1 tab(s) by mouth every 8 hours, As Needed

## 2018-09-04 NOTE — BRIEF OPERATIVE NOTE - PROCEDURE
<<-----Click on this checkbox to enter Procedure Cystoscopy with transurethral resection of bladder tumor (TURBT)  09/04/2018    Active  [id number]

## 2018-09-04 NOTE — ASU DISCHARGE PLAN (ADULT/PEDIATRIC). - NOTIFY
Bleeding that does not stop/Persistent Nausea and Vomiting/Unable to Urinate/Fever greater than 101/Inability to Tolerate Liquids or Foods

## 2018-09-05 LAB — SURGICAL PATHOLOGY STUDY: SIGNIFICANT CHANGE UP

## 2018-09-06 ENCOUNTER — APPOINTMENT (OUTPATIENT)
Dept: INTERNAL MEDICINE | Facility: CLINIC | Age: 68
End: 2018-09-06

## 2018-09-07 ENCOUNTER — APPOINTMENT (OUTPATIENT)
Dept: UROLOGY | Facility: CLINIC | Age: 68
End: 2018-09-07
Payer: MEDICARE

## 2018-09-07 DIAGNOSIS — N90.5 ATROPHY OF VULVA: ICD-10-CM

## 2018-09-07 DIAGNOSIS — R10.2 PELVIC AND PERINEAL PAIN: ICD-10-CM

## 2018-09-07 PROCEDURE — 99214 OFFICE O/P EST MOD 30 MIN: CPT

## 2018-09-13 ENCOUNTER — APPOINTMENT (OUTPATIENT)
Dept: UROLOGY | Facility: CLINIC | Age: 68
End: 2018-09-13
Payer: MEDICARE

## 2018-09-13 DIAGNOSIS — D49.4 NEOPLASM OF UNSPECIFIED BEHAVIOR OF BLADDER: ICD-10-CM

## 2018-09-13 PROCEDURE — 99213 OFFICE O/P EST LOW 20 MIN: CPT

## 2018-09-17 PROBLEM — D49.4 BLADDER TUMOR: Noted: 2018-08-09

## 2018-10-19 ENCOUNTER — APPOINTMENT (OUTPATIENT)
Dept: UROLOGY | Facility: CLINIC | Age: 68
End: 2018-10-19

## 2018-10-23 ENCOUNTER — MEDICATION RENEWAL (OUTPATIENT)
Age: 68
End: 2018-10-23

## 2018-11-29 ENCOUNTER — MEDICATION RENEWAL (OUTPATIENT)
Age: 68
End: 2018-11-29

## 2018-12-03 ENCOUNTER — MEDICATION RENEWAL (OUTPATIENT)
Age: 68
End: 2018-12-03

## 2018-12-13 ENCOUNTER — APPOINTMENT (OUTPATIENT)
Dept: UROLOGY | Facility: CLINIC | Age: 68
End: 2018-12-13
Payer: MEDICARE

## 2018-12-13 VITALS
HEART RATE: 75 BPM | HEIGHT: 64.5 IN | SYSTOLIC BLOOD PRESSURE: 124 MMHG | RESPIRATION RATE: 16 BRPM | WEIGHT: 164 LBS | DIASTOLIC BLOOD PRESSURE: 80 MMHG | OXYGEN SATURATION: 96 % | TEMPERATURE: 98.7 F | BODY MASS INDEX: 27.66 KG/M2

## 2018-12-13 PROCEDURE — 52000 CYSTOURETHROSCOPY: CPT

## 2018-12-20 LAB — CORE LAB FLUID CYTOLOGY: NORMAL

## 2019-03-15 ENCOUNTER — MEDICATION RENEWAL (OUTPATIENT)
Age: 69
End: 2019-03-15

## 2019-04-18 ENCOUNTER — APPOINTMENT (OUTPATIENT)
Dept: UROLOGY | Facility: CLINIC | Age: 69
End: 2019-04-18

## 2019-05-09 ENCOUNTER — APPOINTMENT (OUTPATIENT)
Dept: UROLOGY | Facility: CLINIC | Age: 69
End: 2019-05-09
Payer: MEDICARE

## 2019-05-09 VITALS
HEART RATE: 62 BPM | OXYGEN SATURATION: 95 % | DIASTOLIC BLOOD PRESSURE: 80 MMHG | SYSTOLIC BLOOD PRESSURE: 130 MMHG | TEMPERATURE: 98.3 F

## 2019-05-09 PROCEDURE — 52000 CYSTOURETHROSCOPY: CPT

## 2019-05-10 LAB — URINE CYTOLOGY: NORMAL

## 2019-09-05 ENCOUNTER — RESULT CHARGE (OUTPATIENT)
Age: 69
End: 2019-09-05

## 2019-09-05 DIAGNOSIS — R30.0 DYSURIA: ICD-10-CM

## 2019-09-05 LAB
GLUCOSE UR-MCNC: NORMAL
HGB UR QL STRIP.AUTO: NORMAL
KETONES UR-MCNC: NORMAL
LEUKOCYTE ESTERASE UR QL STRIP: NORMAL
NITRITE UR QL STRIP: POSITIVE
PH UR STRIP: 6
PROT UR STRIP-MCNC: NORMAL
SP GR UR STRIP: 1.01

## 2019-09-05 RX ORDER — CEPHALEXIN 500 MG/1
500 CAPSULE ORAL
Qty: 10 | Refills: 0 | Status: DISCONTINUED | COMMUNITY
Start: 2018-08-24 | End: 2019-09-05

## 2019-09-08 LAB — BACTERIA UR CULT: ABNORMAL

## 2019-09-20 ENCOUNTER — APPOINTMENT (OUTPATIENT)
Dept: INTERNAL MEDICINE | Facility: CLINIC | Age: 69
End: 2019-09-20
Payer: MEDICARE

## 2019-09-20 VITALS — OXYGEN SATURATION: 98 % | TEMPERATURE: 98.8 F | HEART RATE: 76 BPM

## 2019-09-20 PROCEDURE — 99213 OFFICE O/P EST LOW 20 MIN: CPT

## 2019-09-27 ENCOUNTER — APPOINTMENT (OUTPATIENT)
Dept: INTERNAL MEDICINE | Facility: CLINIC | Age: 69
End: 2019-09-27

## 2019-09-29 NOTE — HISTORY OF PRESENT ILLNESS
[FreeTextEntry8] : The patient has had mouth burning for a few days.  It is painful and she says there is a think coating on her tongue.  She has a mild sore throat, food burns her tongue. No fever or URI symptoms.  She was recently on Augmentin.

## 2019-09-29 NOTE — ASSESSMENT
[FreeTextEntry1] : She likely has early oral thrush from the antibiotics.  Will treat with Nystatin.  Call PRN if it persists.

## 2019-09-29 NOTE — PHYSICAL EXAM
[Normal Outer Ear/Nose] : the outer ears and nose were normal in appearance [Normal TMs] : both tympanic membranes were normal [Normal] : no respiratory distress, lungs were clear to auscultation bilaterally and no accessory muscle use [de-identified] : O/P mild erythema.  There is slight buccal white patches

## 2019-10-10 RX ORDER — AMOXICILLIN AND CLAVULANATE POTASSIUM 875; 125 MG/1; MG/1
875-125 TABLET, COATED ORAL
Qty: 20 | Refills: 0 | Status: DISCONTINUED | COMMUNITY
Start: 2019-09-05 | End: 2019-10-10

## 2019-12-01 ENCOUNTER — RX RENEWAL (OUTPATIENT)
Age: 69
End: 2019-12-01

## 2020-01-09 ENCOUNTER — APPOINTMENT (OUTPATIENT)
Dept: UROLOGY | Facility: CLINIC | Age: 70
End: 2020-01-09
Payer: MEDICARE

## 2020-01-09 VITALS
DIASTOLIC BLOOD PRESSURE: 90 MMHG | HEIGHT: 65 IN | SYSTOLIC BLOOD PRESSURE: 132 MMHG | HEART RATE: 63 BPM | WEIGHT: 165 LBS | BODY MASS INDEX: 27.49 KG/M2 | OXYGEN SATURATION: 97 %

## 2020-01-09 PROCEDURE — 52000 CYSTOURETHROSCOPY: CPT

## 2020-05-28 ENCOUNTER — APPOINTMENT (OUTPATIENT)
Dept: INTERNAL MEDICINE | Facility: CLINIC | Age: 70
End: 2020-05-28
Payer: MEDICARE

## 2020-05-28 VITALS
SYSTOLIC BLOOD PRESSURE: 126 MMHG | DIASTOLIC BLOOD PRESSURE: 76 MMHG | WEIGHT: 156 LBS | BODY MASS INDEX: 25.99 KG/M2 | HEIGHT: 65 IN | TEMPERATURE: 96.9 F | OXYGEN SATURATION: 98 %

## 2020-05-28 DIAGNOSIS — Z87.09 PERSONAL HISTORY OF OTHER DISEASES OF THE RESPIRATORY SYSTEM: ICD-10-CM

## 2020-05-28 PROCEDURE — 99213 OFFICE O/P EST LOW 20 MIN: CPT

## 2020-05-28 RX ORDER — FLUCONAZOLE 100 MG/1
100 TABLET ORAL
Qty: 11 | Refills: 0 | Status: DISCONTINUED | COMMUNITY
Start: 2019-10-10 | End: 2020-05-28

## 2020-05-28 RX ORDER — NYSTATIN 100000 [USP'U]/ML
100000 SUSPENSION ORAL 4 TIMES DAILY
Qty: 1 | Refills: 0 | Status: DISCONTINUED | COMMUNITY
Start: 2019-09-20 | End: 2020-05-28

## 2020-06-05 VITALS — SYSTOLIC BLOOD PRESSURE: 130 MMHG | DIASTOLIC BLOOD PRESSURE: 80 MMHG

## 2020-06-05 NOTE — ASSESSMENT
[FreeTextEntry1] : She has chronic sore throat symptoms.  Exam is unremarkable.  An infectious cause seems unlikely but the patient is worried and we can try an antibiotic to see if it helps  Do not see obvious thrush.  Consider GERD as the cause.  She was advised to see an ENT to further evaluate at this point and she agrees.  She has seen Dr. Henderson in the past. \par \par CPE also advised.

## 2020-06-05 NOTE — HISTORY OF PRESENT ILLNESS
[de-identified] : She has a sore throat for two months.  She previously noted a red canker sore in her mouth which resolved.  The sore throat persists and isn't getting better or worse.  She can swallow ok.  Hot fluids helps.  No fever, cough, chest symptoms, dysphagia, dyspnea, sinus pain.  Previous ear pain better.  She does have chronic reflux symptoms.

## 2020-11-25 ENCOUNTER — RX RENEWAL (OUTPATIENT)
Age: 70
End: 2020-11-25

## 2021-01-21 ENCOUNTER — APPOINTMENT (OUTPATIENT)
Dept: UROLOGY | Facility: CLINIC | Age: 71
End: 2021-01-21
Payer: MEDICARE

## 2021-01-21 PROCEDURE — 52000 CYSTOURETHROSCOPY: CPT

## 2021-01-25 LAB — URINE CYTOLOGY: NORMAL

## 2021-03-18 ENCOUNTER — APPOINTMENT (OUTPATIENT)
Dept: INTERNAL MEDICINE | Facility: CLINIC | Age: 71
End: 2021-03-18
Payer: MEDICARE

## 2021-03-18 VITALS — SYSTOLIC BLOOD PRESSURE: 144 MMHG | DIASTOLIC BLOOD PRESSURE: 84 MMHG

## 2021-03-18 VITALS — DIASTOLIC BLOOD PRESSURE: 80 MMHG | SYSTOLIC BLOOD PRESSURE: 140 MMHG

## 2021-03-18 VITALS — TEMPERATURE: 97.1 F | HEART RATE: 70 BPM | OXYGEN SATURATION: 97 %

## 2021-03-18 DIAGNOSIS — Z01.818 ENCOUNTER FOR OTHER PREPROCEDURAL EXAMINATION: ICD-10-CM

## 2021-03-18 DIAGNOSIS — Z92.29 PERSONAL HISTORY OF OTHER DRUG THERAPY: ICD-10-CM

## 2021-03-18 DIAGNOSIS — L03.115 CELLULITIS OF RIGHT LOWER LIMB: ICD-10-CM

## 2021-03-18 DIAGNOSIS — Z86.19 PERSONAL HISTORY OF OTHER INFECTIOUS AND PARASITIC DISEASES: ICD-10-CM

## 2021-03-18 DIAGNOSIS — Z98.890 OTHER SPECIFIED POSTPROCEDURAL STATES: ICD-10-CM

## 2021-03-18 DIAGNOSIS — L03.116 CELLULITIS OF LEFT LOWER LIMB: ICD-10-CM

## 2021-03-18 PROCEDURE — 99214 OFFICE O/P EST MOD 30 MIN: CPT

## 2021-03-18 RX ORDER — AMOXICILLIN AND CLAVULANATE POTASSIUM 875; 125 MG/1; MG/1
875-125 TABLET, COATED ORAL TWICE DAILY
Qty: 14 | Refills: 0 | Status: DISCONTINUED | COMMUNITY
Start: 2020-05-28 | End: 2021-03-18

## 2021-03-18 RX ORDER — ESTRADIOL 0.1 MG/G
0.1 CREAM VAGINAL
Qty: 1 | Refills: 1 | Status: DISCONTINUED | COMMUNITY
Start: 2018-08-02 | End: 2021-03-18

## 2021-03-18 RX ORDER — CLOBETASOL PROPIONATE 0.5 MG/G
0.05 OINTMENT TOPICAL
Qty: 1 | Refills: 0 | Status: DISCONTINUED | COMMUNITY
Start: 2018-09-07 | End: 2021-03-18

## 2021-03-18 NOTE — ASSESSMENT
[FreeTextEntry1] : The blood pressure is slightly elevated at 144/84, 140/80 on Losartan 50 mg today.  She appears well although there may be some anxiety.  She has a headache which is probably not from the BP.  She can use Tylenol PRN.  Add HCTZ12.5 mg QD and continue Losartan 50 mg.  Return to check the BP in 3-4 weeks.  Will set up a CPE which she is due for.  \par \par Discussed diet and exercise.\par \par Urological condition reviewed- she had a cystoscopy in January, 2021 which showed no tumor.  \par \par We discussed the COVID-19 vaccine. She has not had it.  She agrees to get it but she has not pursued it so far.  She has no computer but she says she will have a family member help her.  \par \par Consider a neurology evaluation if the headaches persist.

## 2021-03-18 NOTE — HISTORY OF PRESENT ILLNESS
[de-identified] : The patient is concerned because the blood pressure is elevated.  She says her BP has previously been ok.  In the last two days she has a headache on the top of her head and posterior which she feels is from the BP.  She took Tylenol once without relief.  She checked the BP which was as high as 170/100 two days ago and has o/w been 140-160 systolic.  She takes Losartan 50 mg QD and the last two days took 100 mg.  She has taken 50 mg today so far.  \par \par She denies unusual stress.  She hasn't been watching her diet or exercising.  She doesn't watch her salt intake.  She is on chronic pain from leg orthopedic issues and she is on Tramadol.  She doesn't use NSAIDS.

## 2021-04-20 ENCOUNTER — APPOINTMENT (OUTPATIENT)
Dept: INTERNAL MEDICINE | Facility: CLINIC | Age: 71
End: 2021-04-20
Payer: MEDICARE

## 2021-04-20 ENCOUNTER — NON-APPOINTMENT (OUTPATIENT)
Age: 71
End: 2021-04-20

## 2021-04-20 VITALS — OXYGEN SATURATION: 98 % | BODY MASS INDEX: 26.82 KG/M2 | TEMPERATURE: 98.2 F | WEIGHT: 161 LBS | HEIGHT: 65 IN

## 2021-04-20 VITALS — SYSTOLIC BLOOD PRESSURE: 118 MMHG | DIASTOLIC BLOOD PRESSURE: 70 MMHG

## 2021-04-20 DIAGNOSIS — Z86.39 PERSONAL HISTORY OF OTHER ENDOCRINE, NUTRITIONAL AND METABOLIC DISEASE: ICD-10-CM

## 2021-04-20 DIAGNOSIS — Z87.39 PERSONAL HISTORY OF OTHER DISEASES OF THE MUSCULOSKELETAL SYSTEM AND CONNECTIVE TISSUE: ICD-10-CM

## 2021-04-20 PROCEDURE — 36415 COLL VENOUS BLD VENIPUNCTURE: CPT

## 2021-04-20 PROCEDURE — G0444 DEPRESSION SCREEN ANNUAL: CPT | Mod: 59

## 2021-04-20 PROCEDURE — G0439: CPT

## 2021-04-20 PROCEDURE — 93000 ELECTROCARDIOGRAM COMPLETE: CPT | Mod: 59

## 2021-04-20 RX ORDER — CHROMIUM 200 MCG
TABLET ORAL
Refills: 0 | Status: ACTIVE | COMMUNITY

## 2021-04-20 RX ORDER — PHENAZOPYRIDINE HYDROCHLORIDE 200 MG/1
200 TABLET ORAL
Qty: 40 | Refills: 1 | Status: DISCONTINUED | COMMUNITY
Start: 2018-08-02 | End: 2021-04-20

## 2021-04-20 RX ORDER — UBIDECARENONE 100 MG
100 CAPSULE ORAL
Refills: 0 | Status: ACTIVE | COMMUNITY

## 2021-04-20 NOTE — PHYSICAL EXAM
[No Acute Distress] : no acute distress [Well Nourished] : well nourished [Well Developed] : well developed [Well-Appearing] : well-appearing [Normal Sclera/Conjunctiva] : normal sclera/conjunctiva [PERRL] : pupils equal round and reactive to light [EOMI] : extraocular movements intact [Normal Outer Ear/Nose] : the outer ears and nose were normal in appearance [Normal Oropharynx] : the oropharynx was normal [No JVD] : no jugular venous distention [No Lymphadenopathy] : no lymphadenopathy [Supple] : supple [Thyroid Normal, No Nodules] : the thyroid was normal and there were no nodules present [No Respiratory Distress] : no respiratory distress  [No Accessory Muscle Use] : no accessory muscle use [Clear to Auscultation] : lungs were clear to auscultation bilaterally [Normal Rate] : normal rate  [Regular Rhythm] : with a regular rhythm [Normal S1, S2] : normal S1 and S2 [No Murmur] : no murmur heard [No Carotid Bruits] : no carotid bruits [No Abdominal Bruit] : a ~M bruit was not heard ~T in the abdomen [No Varicosities] : no varicosities [Pedal Pulses Present] : the pedal pulses are present [No Edema] : there was no peripheral edema [No Palpable Aorta] : no palpable aorta [No Extremity Clubbing/Cyanosis] : no extremity clubbing/cyanosis [Soft] : abdomen soft [Non Tender] : non-tender [Non-distended] : non-distended [No HSM] : no HSM [Normal Bowel Sounds] : normal bowel sounds [Normal Posterior Cervical Nodes] : no posterior cervical lymphadenopathy [Normal Anterior Cervical Nodes] : no anterior cervical lymphadenopathy [No CVA Tenderness] : no CVA  tenderness [No Spinal Tenderness] : no spinal tenderness [No Joint Swelling] : no joint swelling [Grossly Normal Strength/Tone] : grossly normal strength/tone [No Rash] : no rash [Coordination Grossly Intact] : coordination grossly intact [No Focal Deficits] : no focal deficits [Normal Gait] : normal gait [Normal Affect] : the affect was normal [Deep Tendon Reflexes (DTR)] : deep tendon reflexes were 2+ and symmetric [Normal Insight/Judgement] : insight and judgment were intact [Normal Appearance] : normal in appearance [No Masses] : no palpable masses [No Nipple Discharge] : no nipple discharge [No Axillary Lymphadenopathy] : no axillary lymphadenopathy

## 2021-04-20 NOTE — ASSESSMENT
[FreeTextEntry1] : The BP is fine at 118/70.  Discussed diet and exercise.\par \par Check TFTs on Levothyroxine.\par \par She was again advised to have the COVID-19 vaccine.\par \par Pneumovax and Shingrix later after the COVID-19 vaccine.  S/p Prevnar. \par \par Mammogram, DEXA, colonoscopy, gyn visits and ophthalmology visit all very overdue and all were urged.  She agrees.\par \par Check a Hepatitis C Ab.  \par \par She sees her urologist.

## 2021-04-20 NOTE — HISTORY OF PRESENT ILLNESS
[FreeTextEntry1] : The patient is here for a routine visit.  [de-identified] : She has more urinary frequency on the HCTZ but she is tolerating it. \par \par The headaches are improved.  \par \par IT has been a difficult year- there have been deaths of family members. \par \par She saw an orthopedist for knee pain and she is starting PT.  She doesn't exercise.

## 2021-05-23 ENCOUNTER — RX RENEWAL (OUTPATIENT)
Age: 71
End: 2021-05-23

## 2021-05-25 ENCOUNTER — NON-APPOINTMENT (OUTPATIENT)
Age: 71
End: 2021-05-25

## 2021-05-25 LAB
25(OH)D3 SERPL-MCNC: 54.3 NG/ML
ALBUMIN SERPL ELPH-MCNC: 4.4 G/DL
ALP BLD-CCNC: 112 U/L
ALT SERPL-CCNC: 24 U/L
ANION GAP SERPL CALC-SCNC: 13 MMOL/L
APPEARANCE: CLEAR
AST SERPL-CCNC: 28 U/L
BACTERIA: NEGATIVE
BASOPHILS # BLD AUTO: 0.04 K/UL
BASOPHILS NFR BLD AUTO: 0.5 %
BILIRUB SERPL-MCNC: 0.4 MG/DL
BILIRUBIN URINE: NEGATIVE
BLOOD URINE: NEGATIVE
BUN SERPL-MCNC: 17 MG/DL
CALCIUM SERPL-MCNC: 9.6 MG/DL
CHLORIDE SERPL-SCNC: 100 MMOL/L
CHOLEST SERPL-MCNC: 227 MG/DL
CO2 SERPL-SCNC: 24 MMOL/L
COLOR: YELLOW
CREAT SERPL-MCNC: 0.89 MG/DL
EOSINOPHIL # BLD AUTO: 0.13 K/UL
EOSINOPHIL NFR BLD AUTO: 1.7 %
ESTIMATED AVERAGE GLUCOSE: 114 MG/DL
GLUCOSE QUALITATIVE U: NEGATIVE
GLUCOSE SERPL-MCNC: 93 MG/DL
HBA1C MFR BLD HPLC: 5.6 %
HCT VFR BLD CALC: 46 %
HCV AB SER QL: NONREACTIVE
HCV S/CO RATIO: 0.09 S/CO
HDLC SERPL-MCNC: 55 MG/DL
HGB BLD-MCNC: 14.9 G/DL
HYALINE CASTS: 3 /LPF
IMM GRANULOCYTES NFR BLD AUTO: 0.4 %
KETONES URINE: NEGATIVE
LDLC SERPL CALC-MCNC: 143 MG/DL
LEUKOCYTE ESTERASE URINE: NEGATIVE
LYMPHOCYTES # BLD AUTO: 2.46 K/UL
LYMPHOCYTES NFR BLD AUTO: 31.6 %
MAN DIFF?: NORMAL
MCHC RBC-ENTMCNC: 31.4 PG
MCHC RBC-ENTMCNC: 32.4 GM/DL
MCV RBC AUTO: 97 FL
MICROSCOPIC-UA: NORMAL
MONOCYTES # BLD AUTO: 0.63 K/UL
MONOCYTES NFR BLD AUTO: 8.1 %
NEUTROPHILS # BLD AUTO: 4.49 K/UL
NEUTROPHILS NFR BLD AUTO: 57.7 %
NITRITE URINE: NEGATIVE
NONHDLC SERPL-MCNC: 172 MG/DL
PH URINE: 6
PLATELET # BLD AUTO: 369 K/UL
POTASSIUM SERPL-SCNC: 4.4 MMOL/L
PROT SERPL-MCNC: 7.4 G/DL
PROTEIN URINE: NORMAL
RBC # BLD: 4.74 M/UL
RBC # FLD: 13 %
RED BLOOD CELLS URINE: 1 /HPF
SODIUM SERPL-SCNC: 137 MMOL/L
SPECIFIC GRAVITY URINE: 1.02
SQUAMOUS EPITHELIAL CELLS: 1 /HPF
T4 FREE SERPL-MCNC: 1.2 NG/DL
TRIGL SERPL-MCNC: 143 MG/DL
TSH SERPL-ACNC: 2.3 UIU/ML
UROBILINOGEN URINE: NORMAL
WBC # FLD AUTO: 7.78 K/UL
WHITE BLOOD CELLS URINE: 1 /HPF

## 2021-08-26 ENCOUNTER — RX RENEWAL (OUTPATIENT)
Age: 71
End: 2021-08-26

## 2021-09-02 DIAGNOSIS — J02.9 ACUTE PHARYNGITIS, UNSPECIFIED: ICD-10-CM

## 2021-10-06 ENCOUNTER — LABORATORY RESULT (OUTPATIENT)
Age: 71
End: 2021-10-06

## 2021-10-08 ENCOUNTER — NON-APPOINTMENT (OUTPATIENT)
Age: 71
End: 2021-10-08

## 2021-10-11 ENCOUNTER — APPOINTMENT (OUTPATIENT)
Dept: UROLOGY | Facility: CLINIC | Age: 71
End: 2021-10-11
Payer: MEDICARE

## 2021-10-11 LAB
APPEARANCE: CLEAR
BACTERIA UR CULT: NORMAL
BILIRUBIN URINE: ABNORMAL
BLOOD URINE: NEGATIVE
COLOR: ABNORMAL
GLUCOSE QUALITATIVE U: NEGATIVE
KETONES URINE: NEGATIVE
LEUKOCYTE ESTERASE URINE: NEGATIVE
NITRITE URINE: NEGATIVE
PH URINE: 6.5
PROTEIN URINE: NORMAL
SPECIFIC GRAVITY URINE: 1.02
UROBILINOGEN URINE: ABNORMAL

## 2021-10-11 PROCEDURE — 99213 OFFICE O/P EST LOW 20 MIN: CPT

## 2021-10-11 NOTE — PHYSICAL EXAM
[General Appearance - Well Developed] : well developed [General Appearance - Well Nourished] : well nourished [Normal Appearance] : normal appearance [Well Groomed] : well groomed [General Appearance - In No Acute Distress] : no acute distress [Abdomen Soft] : soft [Abdomen Tenderness] : non-tender [Costovertebral Angle Tenderness] : no ~M costovertebral angle tenderness [Oriented To Time, Place, And Person] : oriented to person, place, and time

## 2021-10-12 LAB
APPEARANCE: CLEAR
BACTERIA UR CULT: NORMAL
BACTERIA: NEGATIVE
BILIRUBIN URINE: ABNORMAL
BLOOD URINE: NEGATIVE
COLOR: ABNORMAL
GLUCOSE QUALITATIVE U: NEGATIVE
HYALINE CASTS: 5 /LPF
KETONES URINE: NEGATIVE
LEUKOCYTE ESTERASE URINE: NEGATIVE
MICROSCOPIC-UA: NORMAL
NITRITE URINE: POSITIVE
PH URINE: 6.5
PROTEIN URINE: NEGATIVE
RED BLOOD CELLS URINE: 3 /HPF
SPECIFIC GRAVITY URINE: 1.02
SQUAMOUS EPITHELIAL CELLS: 1 /HPF
URINE CYTOLOGY: NORMAL
UROBILINOGEN URINE: ABNORMAL
WHITE BLOOD CELLS URINE: 1 /HPF

## 2021-10-12 NOTE — ASSESSMENT
[FreeTextEntry1] : Patient presents with symptoms of bladder pain, dysuria, frequency, and urgency that started 1 week ago. Patient's symptoms mildly improved with azo and her home pain medications. She is mostly concerned for bladder cancer recurrence. Pt has seen Sangeeta Hernandez in the past for these sx\par --UA+microscopy, cultures, cytology\par --Continue AZO for symptom control\par --Continue to avoid bladder irritants and increase water drinking\par --RTC for surveillance cysto Jan, 2021\par --refer to Dr Champagne or  in Sangeeta's absence for sx control\par

## 2021-10-12 NOTE — HISTORY OF PRESENT ILLNESS
[FreeTextEntry1] : Patient has a history of bladder cancer, s/p TURBT in 2018 found to be non-invasive, low grade cancer, no muscle involvement. Recent cysto in Jan, 2021 negative, Urine cytology in Jan, 2021 negative.\par \par Presents with cc of extreme pain in bladder radiating to vulva and thigh, increased frequency. Patient has been using azo and was taking macrobid for suspected infection prior to cultures that were negative (last week). Patient had these symptoms before when she was diagnosed with bladder cancer. Patient is having loss of sleep. Denies hematuria grossly, no microscopic blood on the UA (within normal limits), good urine flow, patient is also having urgency, no urinary incontinence, mild stress incontinence doesn’t bother. Patient has been taking azo that has been helping in conjunction with the pain medications she has for chronic pain (tramadol long acting and short acting). Patient only has some discomfort in the past ocassionally if she holds her urine, but never symptoms like this. Pain onset 1.5 weeks ago. Patient continues to drinks more water now, continues to not drink coffee. \par \par Medical changes- patient is now on HCTZ for HTN, recent patient developed acid reflux, Patient has chronic pain taking pain medication.

## 2021-10-13 ENCOUNTER — OUTPATIENT (OUTPATIENT)
Dept: OUTPATIENT SERVICES | Facility: HOSPITAL | Age: 71
LOS: 1 days | End: 2021-10-13
Payer: MEDICARE

## 2021-10-13 ENCOUNTER — APPOINTMENT (OUTPATIENT)
Dept: UROLOGY | Facility: CLINIC | Age: 71
End: 2021-10-13
Payer: MEDICARE

## 2021-10-13 VITALS
TEMPERATURE: 97.5 F | RESPIRATION RATE: 17 BRPM | WEIGHT: 161 LBS | OXYGEN SATURATION: 99 % | SYSTOLIC BLOOD PRESSURE: 133 MMHG | DIASTOLIC BLOOD PRESSURE: 85 MMHG | HEART RATE: 65 BPM | BODY MASS INDEX: 26.82 KG/M2 | HEIGHT: 65 IN

## 2021-10-13 DIAGNOSIS — R35.0 FREQUENCY OF MICTURITION: ICD-10-CM

## 2021-10-13 DIAGNOSIS — Z98.890 OTHER SPECIFIED POSTPROCEDURAL STATES: Chronic | ICD-10-CM

## 2021-10-13 DIAGNOSIS — N90.89 OTHER SPECIFIED NONINFLAMMATORY DISORDERS OF VULVA AND PERINEUM: Chronic | ICD-10-CM

## 2021-10-13 DIAGNOSIS — Z90.49 ACQUIRED ABSENCE OF OTHER SPECIFIED PARTS OF DIGESTIVE TRACT: Chronic | ICD-10-CM

## 2021-10-13 DIAGNOSIS — C67.0 MALIGNANT NEOPLASM OF TRIGONE OF BLADDER: ICD-10-CM

## 2021-10-13 DIAGNOSIS — Z86.018 PERSONAL HISTORY OF OTHER BENIGN NEOPLASM: Chronic | ICD-10-CM

## 2021-10-13 PROCEDURE — 52000 CYSTOURETHROSCOPY: CPT

## 2021-10-19 ENCOUNTER — APPOINTMENT (OUTPATIENT)
Dept: UROLOGY | Facility: CLINIC | Age: 71
End: 2021-10-19
Payer: MEDICARE

## 2021-10-19 DIAGNOSIS — R31.29 OTHER MICROSCOPIC HEMATURIA: ICD-10-CM

## 2021-10-19 DIAGNOSIS — R89.6 ABNORMAL CYTOLOGICAL FINDINGS IN SPECIMENS FROM OTHER ORGANS, SYSTEMS AND TISSUES: ICD-10-CM

## 2021-10-19 DIAGNOSIS — R35.0 FREQUENCY OF MICTURITION: ICD-10-CM

## 2021-10-19 DIAGNOSIS — L90.0 LICHEN SCLEROSUS ET ATROPHICUS: ICD-10-CM

## 2021-10-19 PROCEDURE — 51798 US URINE CAPACITY MEASURE: CPT

## 2021-10-19 PROCEDURE — 99215 OFFICE O/P EST HI 40 MIN: CPT

## 2021-10-19 RX ORDER — NITROFURANTOIN (MONOHYDRATE/MACROCRYSTALS) 25; 75 MG/1; MG/1
100 CAPSULE ORAL TWICE DAILY
Qty: 10 | Refills: 0 | Status: DISCONTINUED | COMMUNITY
Start: 2021-10-06 | End: 2021-10-19

## 2021-10-22 ENCOUNTER — NON-APPOINTMENT (OUTPATIENT)
Age: 71
End: 2021-10-22

## 2021-10-29 ENCOUNTER — NON-APPOINTMENT (OUTPATIENT)
Age: 71
End: 2021-10-29

## 2021-11-19 ENCOUNTER — RX RENEWAL (OUTPATIENT)
Age: 71
End: 2021-11-19

## 2022-01-27 ENCOUNTER — APPOINTMENT (OUTPATIENT)
Dept: UROLOGY | Facility: CLINIC | Age: 72
End: 2022-01-27

## 2022-02-11 ENCOUNTER — RX RENEWAL (OUTPATIENT)
Age: 72
End: 2022-02-11

## 2022-03-12 ENCOUNTER — RX RENEWAL (OUTPATIENT)
Age: 72
End: 2022-03-12

## 2022-05-11 ENCOUNTER — RX RENEWAL (OUTPATIENT)
Age: 72
End: 2022-05-11

## 2022-10-12 ENCOUNTER — APPOINTMENT (OUTPATIENT)
Dept: UROLOGY | Facility: CLINIC | Age: 72
End: 2022-10-12

## 2022-10-12 ENCOUNTER — OUTPATIENT (OUTPATIENT)
Dept: OUTPATIENT SERVICES | Facility: HOSPITAL | Age: 72
LOS: 1 days | End: 2022-10-12
Payer: MEDICARE

## 2022-10-12 DIAGNOSIS — N90.89 OTHER SPECIFIED NONINFLAMMATORY DISORDERS OF VULVA AND PERINEUM: Chronic | ICD-10-CM

## 2022-10-12 DIAGNOSIS — Z98.890 OTHER SPECIFIED POSTPROCEDURAL STATES: Chronic | ICD-10-CM

## 2022-10-12 DIAGNOSIS — R35.0 FREQUENCY OF MICTURITION: ICD-10-CM

## 2022-10-12 DIAGNOSIS — Z90.49 ACQUIRED ABSENCE OF OTHER SPECIFIED PARTS OF DIGESTIVE TRACT: Chronic | ICD-10-CM

## 2022-10-12 DIAGNOSIS — Z86.018 PERSONAL HISTORY OF OTHER BENIGN NEOPLASM: Chronic | ICD-10-CM

## 2022-10-12 PROCEDURE — 52000 CYSTOURETHROSCOPY: CPT

## 2022-10-12 PROCEDURE — 88112 CYTOPATH CELL ENHANCE TECH: CPT | Mod: 26

## 2022-10-17 DIAGNOSIS — C67.0 MALIGNANT NEOPLASM OF TRIGONE OF BLADDER: ICD-10-CM

## 2022-10-17 LAB — URINE CYTOLOGY: NORMAL

## 2022-11-10 ENCOUNTER — APPOINTMENT (OUTPATIENT)
Dept: INTERNAL MEDICINE | Facility: CLINIC | Age: 72
End: 2022-11-10

## 2022-11-10 ENCOUNTER — NON-APPOINTMENT (OUTPATIENT)
Age: 72
End: 2022-11-10

## 2022-11-10 VITALS
WEIGHT: 155 LBS | TEMPERATURE: 97.2 F | HEIGHT: 64 IN | HEART RATE: 86 BPM | OXYGEN SATURATION: 97 % | RESPIRATION RATE: 16 BRPM | BODY MASS INDEX: 26.46 KG/M2

## 2022-11-10 VITALS — SYSTOLIC BLOOD PRESSURE: 132 MMHG | DIASTOLIC BLOOD PRESSURE: 80 MMHG

## 2022-11-10 DIAGNOSIS — F32.A DEPRESSION, UNSPECIFIED: ICD-10-CM

## 2022-11-10 DIAGNOSIS — E03.9 HYPOTHYROIDISM, UNSPECIFIED: ICD-10-CM

## 2022-11-10 DIAGNOSIS — K76.0 FATTY (CHANGE OF) LIVER, NOT ELSEWHERE CLASSIFIED: ICD-10-CM

## 2022-11-10 DIAGNOSIS — Z23 ENCOUNTER FOR IMMUNIZATION: ICD-10-CM

## 2022-11-10 PROCEDURE — G0444 DEPRESSION SCREEN ANNUAL: CPT

## 2022-11-10 PROCEDURE — 90662 IIV NO PRSV INCREASED AG IM: CPT

## 2022-11-10 PROCEDURE — 93000 ELECTROCARDIOGRAM COMPLETE: CPT | Mod: 59

## 2022-11-10 PROCEDURE — G0008: CPT

## 2022-11-10 PROCEDURE — G0439: CPT

## 2022-11-10 PROCEDURE — 36415 COLL VENOUS BLD VENIPUNCTURE: CPT

## 2022-11-10 RX ORDER — CEFUROXIME AXETIL 500 MG/1
500 TABLET ORAL
Qty: 14 | Refills: 0 | Status: DISCONTINUED | COMMUNITY
Start: 2021-09-02 | End: 2022-11-10

## 2022-11-10 RX ORDER — ESOMEPRAZOLE MAGNESIUM 40 MG/1
40 CAPSULE, DELAYED RELEASE ORAL
Qty: 30 | Refills: 5 | Status: DISCONTINUED | COMMUNITY
Start: 2021-09-15 | End: 2022-11-10

## 2022-11-10 NOTE — HEALTH RISK ASSESSMENT
[Never] : Never [No] : No [No falls in past year] : Patient reported no falls in the past year [2] : 1) Little interest or pleasure doing things for more than half of the days (2) [1] : 2) Feeling down, depressed, or hopeless for several days (1) [Fully functional (bathing, dressing, toileting, transferring, walking, feeding)] : Fully functional (bathing, dressing, toileting, transferring, walking, feeding) [Fully functional (using the telephone, shopping, preparing meals, housekeeping, doing laundry, using] : Fully functional and needs no help or supervision to perform IADLs (using the telephone, shopping, preparing meals, housekeeping, doing laundry, using transportation, managing medications and managing finances) [[id number]] : 3 [Reports changes in hearing] : Reports no changes in hearing [Reports changes in vision] : Reports no changes in vision [Reports changes in dental health] : Reports no changes in dental health

## 2022-11-10 NOTE — HISTORY OF PRESENT ILLNESS
[FreeTextEntry1] : The patient is here for a routine visit.  [de-identified] : She has anxiety. She reports that she has had multiple friends and relatives with serious illnesses and there have been deaths.  The patient has been helping people and has been under stress. \par \par Her diet is fair and she exercises a little.\par \par She has chronic back and joint pain and she sees a pain management doctor.  She says she is on Tramadol  mg QD and Tramadol 50 mg PRN for breakthrough pain.  \par \par She has urinary frequency.  She takes Azo which helps somewhat.  She had been on Alfuzosin but she stopped it due to side effects.

## 2022-11-10 NOTE — ASSESSMENT
[FreeTextEntry1] : The blood pressure is ok at 132/80.  Diet and exercise discussed.  Check lipid sand a HgBA1c.\par \par Check TFTs on Levothyroxine.  \par \par She was counseled regarding the stress and anxiety.  We discussed the chronic pain and she sees pain management.\par \par Flu vaccine today.\par \par S/p Prevnar.  Pneumovax next time.  Shingrix advised at pharmacy.  \par \par S/p COVID-19 vaccine but no booster and the bivalent vaccine was advised. \par \par She is overdue for all HM and she was urged to go forward with a mammogram, DEXA, colonoscopy, gyn visit, eye doctor.\par \par She recently saw her urologist and she had a negative cystoscopy.

## 2022-12-06 VITALS — HEIGHT: 66 IN

## 2022-12-20 ENCOUNTER — NON-APPOINTMENT (OUTPATIENT)
Age: 72
End: 2022-12-20

## 2022-12-20 DIAGNOSIS — Z85.51 PERSONAL HISTORY OF MALIGNANT NEOPLASM OF BLADDER: ICD-10-CM

## 2022-12-20 DIAGNOSIS — Z87.19 PERSONAL HISTORY OF OTHER DISEASES OF THE DIGESTIVE SYSTEM: ICD-10-CM

## 2022-12-20 DIAGNOSIS — Z86.79 PERSONAL HISTORY OF OTHER DISEASES OF THE CIRCULATORY SYSTEM: ICD-10-CM

## 2022-12-20 DIAGNOSIS — Z87.39 PERSONAL HISTORY OF OTHER DISEASES OF THE MUSCULOSKELETAL SYSTEM AND CONNECTIVE TISSUE: ICD-10-CM

## 2022-12-20 DIAGNOSIS — M46.1 SACROILIITIS, NOT ELSEWHERE CLASSIFIED: ICD-10-CM

## 2022-12-20 DIAGNOSIS — Z87.09 PERSONAL HISTORY OF OTHER DISEASES OF THE RESPIRATORY SYSTEM: ICD-10-CM

## 2022-12-20 DIAGNOSIS — Z79.891 LONG TERM (CURRENT) USE OF OPIATE ANALGESIC: ICD-10-CM

## 2023-01-03 ENCOUNTER — APPOINTMENT (OUTPATIENT)
Dept: PAIN MANAGEMENT | Facility: CLINIC | Age: 73
End: 2023-01-03
Payer: OTHER MISCELLANEOUS

## 2023-01-03 VITALS — BODY MASS INDEX: 25.02 KG/M2 | WEIGHT: 155 LBS

## 2023-01-03 PROCEDURE — 99213 OFFICE O/P EST LOW 20 MIN: CPT | Mod: 95

## 2023-02-03 ENCOUNTER — APPOINTMENT (OUTPATIENT)
Dept: PAIN MANAGEMENT | Facility: CLINIC | Age: 73
End: 2023-02-03

## 2023-02-17 ENCOUNTER — APPOINTMENT (OUTPATIENT)
Dept: PAIN MANAGEMENT | Facility: CLINIC | Age: 73
End: 2023-02-17
Payer: OTHER MISCELLANEOUS

## 2023-02-17 VITALS — BODY MASS INDEX: 24.91 KG/M2 | HEIGHT: 66 IN | WEIGHT: 155 LBS

## 2023-02-17 DIAGNOSIS — G89.4 CHRONIC PAIN SYNDROME: ICD-10-CM

## 2023-02-17 PROCEDURE — 99213 OFFICE O/P EST LOW 20 MIN: CPT | Mod: ACP,95

## 2023-02-17 NOTE — DISCUSSION/SUMMARY
[Medication Risks Reviewed] : Medication risks reviewed [de-identified] : Prescriptions renewed. Opioid [place]obt[place] and [place]. SOAPP-R completed on chart. The patient's medications are documented to the best of their ability. Quality of life and functional ability improved on medications. The patient is showing no aberrant behavior or evidence of diversion. The patient was advised not to use narcotic medication while operating an automobile or heavy machinery due to potential sedation or dizziness. The patient was educated to the risks associated with potential opioid dependence and addiction. Urine toxicology screens as per office protocol. Use of multimodal analgesia used prn.\par Follow up one month.

## 2023-02-17 NOTE — REASON FOR VISIT
[Home] : at home, [unfilled] , at the time of the visit. [Medical Office: ([facility])___] : at the medical office located in  [Patient] : the patient [Self] : self [FreeTextEntry2] : Back and neck pain

## 2023-02-17 NOTE — HISTORY OF PRESENT ILLNESS
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 States issues have increased over the years and more arthritis has set in.  Pain medication allows her to get out of bed and  be functional . She is unable to take any  NSAID"s due to GI issues. She has tried and failed stronger narcotic medications  and other muscle relaxers.  [] : Post Surgical Visit: no [FreeTextEntry7] : [facility] KNEE  [de-identified] : HUGO

## 2023-03-14 ENCOUNTER — APPOINTMENT (OUTPATIENT)
Dept: PAIN MANAGEMENT | Facility: CLINIC | Age: 73
End: 2023-03-14
Payer: OTHER MISCELLANEOUS

## 2023-03-14 VITALS — HEIGHT: 66 IN | BODY MASS INDEX: 24.91 KG/M2 | WEIGHT: 155 LBS

## 2023-03-14 PROCEDURE — 99214 OFFICE O/P EST MOD 30 MIN: CPT | Mod: ACP

## 2023-03-14 PROCEDURE — 99072 ADDL SUPL MATRL&STAF TM PHE: CPT

## 2023-03-14 NOTE — DISCUSSION/SUMMARY
[Medication Risks Reviewed] : Medication risks reviewed [de-identified] : Prescriptions renewed. Opioid [place]obt[place] and [place]. SOAPP-R completed on chart. The patient's medications are documented to the best of their ability. Quality of life and functional ability improved on medications. The patient is showing no aberrant behavior or evidence of diversion. The patient was advised not to use narcotic medication while operating an automobile or heavy machinery due to potential sedation or dizziness. The patient was educated to the risks associated with potential opioid dependence and addiction. Urine toxicology screens as per office protocol. Use of multimodal analgesia used prn.\par Follow up one month.

## 2023-03-14 NOTE — ASSESSMENT
[FreeTextEntry1] : Alert and oriented X3. Unable to sit erect. Slouched posture, antalgic gait. Left hand dominant and numbness to the first three fingers of her left hand.

## 2023-03-14 NOTE — HISTORY OF PRESENT ILLNESS
[Neck] : neck [Lower back] : lower back [Work related] : work related [Gradual] : gradual [9] : 9 [Radiating] : radiating [Sharp] : sharp [Constant] : constant [Household chores] : household chores [Work] : work [Sleep] : sleep [Rest] : rest [Meds] : meds [Ice] : ice [Heat] : heat [Sitting] : sitting [Standing] : standing [Walking] : walking [Bending forward] : bending forward [Stairs] : stairs [Retired] : Work status: retired [] : This patient has had an injection before: no [FreeTextEntry1] : B/L KNEES  [FreeTextEntry7] : INTO B/L THIGHS [de-identified] : 2022 [de-identified] : STRETCHING 5-7X WEEK , HOME EXERCISE PROGRAM  [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

## 2023-04-12 ENCOUNTER — APPOINTMENT (OUTPATIENT)
Dept: PAIN MANAGEMENT | Facility: CLINIC | Age: 73
End: 2023-04-12
Payer: OTHER MISCELLANEOUS

## 2023-04-12 VITALS — HEIGHT: 66 IN | WEIGHT: 155 LBS | BODY MASS INDEX: 24.91 KG/M2

## 2023-04-12 DIAGNOSIS — G89.29 OTHER CHRONIC PAIN: ICD-10-CM

## 2023-04-12 PROCEDURE — 99213 OFFICE O/P EST LOW 20 MIN: CPT | Mod: ACP,95

## 2023-04-12 NOTE — REASON FOR VISIT
[Follow-Up Visit] : a follow-up pain management visit [Home] : at home, [unfilled] , at the time of the visit. [Medical Office: ([facility])___] : at the medical office located in  [Patient] : the patient [Self] : self [FreeTextEntry2] : Neck and back pain

## 2023-04-12 NOTE — DISCUSSION/SUMMARY
[Medication Risks Reviewed] : Medication risks reviewed [de-identified] : Prescriptions renewed. Opioid [place]obt[place] and [place]. SOAPP-R completed on chart. The patient's medications are documented to the best of their ability. Quality of life and functional ability improved on medications. The patient is showing no aberrant behavior or evidence of diversion. The patient was advised not to use narcotic medication while operating an automobile or heavy machinery due to potential sedation or dizziness. The patient was educated to the risks associated with potential opioid dependence and addiction. Urine toxicology screens as per office protocol. Use of multimodal analgesia used prn.\par Follow up one month.

## 2023-04-12 NOTE — HISTORY OF PRESENT ILLNESS
[Neck] : neck [Lower back] : lower back [Work related] : work related [Gradual] : gradual [7] : 7 [Radiating] : radiating [Sharp] : sharp [Constant] : constant [Household chores] : household chores [Work] : work [Sleep] : sleep [Rest] : rest [Meds] : meds [Ice] : ice [Heat] : heat [Sitting] : sitting  used [Standing] : standing [Walking] : walking [Bending forward] : bending forward [Stairs] : stairs [Retired] : Work status: retired [] : This patient has had an injection before: no [FreeTextEntry1] : B/L KNEES  [FreeTextEntry7] : INTO B/L THIGHS [de-identified] : PT WENT FOR PHYSICAL THERAPY FOR 6 YEARS AND A CHIROPRACTOR AND CONTINUES STRETCHING 5-7X WEEK , HOME EXERCISE PROGRAM FROM HOME SINCE 2021 PT CAN NOT RECALL DATE  [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

## 2023-04-14 ENCOUNTER — NON-APPOINTMENT (OUTPATIENT)
Age: 73
End: 2023-04-14

## 2023-05-08 ENCOUNTER — APPOINTMENT (OUTPATIENT)
Dept: PAIN MANAGEMENT | Facility: CLINIC | Age: 73
End: 2023-05-08
Payer: OTHER MISCELLANEOUS

## 2023-05-08 VITALS — BODY MASS INDEX: 24.91 KG/M2 | HEIGHT: 66 IN | WEIGHT: 155 LBS

## 2023-05-08 PROCEDURE — 99214 OFFICE O/P EST MOD 30 MIN: CPT | Mod: ACP,95

## 2023-05-08 NOTE — HISTORY OF PRESENT ILLNESS
[Neck] : neck [Lower back] : lower back [Work related] : work related [Gradual] : gradual [7] : 7 [Radiating] : radiating [Sharp] : sharp [Constant] : constant [Household chores] : household chores [Work] : work [Sleep] : sleep [Rest] : rest [Meds] : meds [Ice] : ice [Heat] : heat [Sitting] : sitting [Standing] : standing [Walking] : walking [Bending forward] : bending forward [Stairs] : stairs [Retired] : Work status: retired [] : This patient has had an injection before: no [FreeTextEntry1] : B/L KNEES  [FreeTextEntry3] : 10/10/2008 [FreeTextEntry7] : INTO B/L THIGHS [de-identified] : PT WENT FOR PHYSICAL THERAPY FOR 6 YEARS AND A CHIROPRACTOR AND CONTINUES STRETCHING 5-7X WEEK , HOME EXERCISE PROGRAM FROM HOME SINCE 2021 PT CAN NOT RECALL DATE  [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

## 2023-05-08 NOTE — DISCUSSION/SUMMARY
[Medication Risks Reviewed] : Medication risks reviewed [de-identified] : Prescriptions renewed. Opioid [place]obt[place] and [place]. SOAPP-R completed on chart. The patient's medications are documented to the best of their ability. Quality of life and functional ability improved on medications. The patient is showing no aberrant behavior or evidence of diversion. The patient was advised not to use narcotic medication while operating an automobile or heavy machinery due to potential sedation or dizziness. The patient was educated to the risks associated with potential opioid dependence and addiction. Urine toxicology screens as per office protocol. Use of multimodal analgesia used prn.\par Follow up one month.

## 2023-05-08 NOTE — REASON FOR VISIT
[Follow-Up Visit] : a follow-up pain management visit [Home] : at home, [unfilled] , at the time of the visit. [Medical Office: ([facility])___] : at the medical office located in  [Patient] : the patient [Self] : self [FreeTextEntry2] : Med renewal

## 2023-06-12 RX ORDER — TRAMADOL HYDROCHLORIDE 200 MG/1
200 TABLET, EXTENDED RELEASE ORAL
Qty: 30 | Refills: 0 | Status: DISCONTINUED | COMMUNITY
Start: 2023-02-17 | End: 2023-06-12

## 2023-06-12 RX ORDER — TRAMADOL HYDROCHLORIDE 200 MG/1
200 TABLET, EXTENDED RELEASE ORAL
Qty: 30 | Refills: 0 | Status: DISCONTINUED | COMMUNITY
Start: 2023-01-03 | End: 2023-06-12

## 2023-06-12 RX ORDER — TRAMADOL HYDROCHLORIDE 50 MG/1
50 TABLET, COATED ORAL
Qty: 60 | Refills: 0 | Status: DISCONTINUED | COMMUNITY
Start: 2023-02-17 | End: 2023-06-12

## 2023-06-12 RX ORDER — TRAMADOL HYDROCHLORIDE 50 MG/1
50 TABLET, COATED ORAL
Qty: 60 | Refills: 0 | Status: DISCONTINUED | COMMUNITY
Start: 2023-03-14 | End: 2023-06-12

## 2023-06-12 RX ORDER — CARISOPRODOL 350 MG/1
350 TABLET ORAL
Qty: 60 | Refills: 0 | Status: DISCONTINUED | COMMUNITY
Start: 2023-04-12 | End: 2023-06-12

## 2023-06-12 RX ORDER — TRAMADOL HYDROCHLORIDE 200 MG/1
200 TABLET, EXTENDED RELEASE ORAL
Qty: 30 | Refills: 0 | Status: DISCONTINUED | COMMUNITY
Start: 2023-04-12 | End: 2023-06-12

## 2023-06-12 RX ORDER — CARISOPRODOL 350 MG/1
350 TABLET ORAL
Qty: 60 | Refills: 0 | Status: DISCONTINUED | COMMUNITY
Start: 2023-03-14 | End: 2023-06-12

## 2023-06-12 RX ORDER — TRAMADOL HYDROCHLORIDE 50 MG/1
50 TABLET, COATED ORAL
Qty: 60 | Refills: 0 | Status: DISCONTINUED | COMMUNITY
Start: 2023-04-12 | End: 2023-06-12

## 2023-06-12 RX ORDER — CARISOPRODOL 350 MG/1
350 TABLET ORAL
Qty: 60 | Refills: 0 | Status: DISCONTINUED | COMMUNITY
Start: 2023-02-17 | End: 2023-06-12

## 2023-06-12 RX ORDER — TRAMADOL HYDROCHLORIDE 200 MG/1
200 TABLET, EXTENDED RELEASE ORAL
Qty: 30 | Refills: 0 | Status: DISCONTINUED | COMMUNITY
Start: 2023-03-14 | End: 2023-06-12

## 2023-06-12 RX ORDER — TRAMADOL HYDROCHLORIDE 50 MG/1
50 TABLET, COATED ORAL TWICE DAILY
Qty: 60 | Refills: 0 | Status: DISCONTINUED | COMMUNITY
Start: 2023-01-03 | End: 2023-06-12

## 2023-06-20 ENCOUNTER — APPOINTMENT (OUTPATIENT)
Dept: PAIN MANAGEMENT | Facility: CLINIC | Age: 73
End: 2023-06-20
Payer: OTHER MISCELLANEOUS

## 2023-06-20 PROCEDURE — 99213 OFFICE O/P EST LOW 20 MIN: CPT | Mod: ACP

## 2023-06-20 NOTE — DISCUSSION/SUMMARY
[Medication Risks Reviewed] : Medication risks reviewed [de-identified] : Prescriptions renewed. [place] and [place]. SOAPP-R completed on chart. The patient's medications are documented to the best of their ability. Quality of life and functional ability improved on medications. The patient is showing no aberrant behavior or evidence of diversion. The patient was advised not to use narcotic medication while operating an automobile or heavy machinery due to potential sedation or dizziness. The patient was educated to the risks associated with potential opioid dependence and addiction. Urine toxicology screens as per office protocol. Use of multimodal analgesia used prn.\par Follow up one month.\par

## 2023-06-20 NOTE — HISTORY OF PRESENT ILLNESS
[Neck] : neck [Lower back] : lower back [Work related] : work related [Gradual] : gradual [9] : 9 [7] : 7 [Dull/Aching] : dull/aching [Radiating] : radiating [Sharp] : sharp [Constant] : constant [Household chores] : household chores [Work] : work [Sleep] : sleep [Rest] : rest [Meds] : meds [Ice] : ice [Heat] : heat [Sitting] : sitting [Standing] : standing [Walking] : walking [Bending forward] : bending forward [Stairs] : stairs [Retired] : Work status: retired [de-identified] : Chronic neck and back pain. States pain varies. Meds effective. She uses the short acting Tramadol and Soma prn. 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

## 2023-07-19 ENCOUNTER — APPOINTMENT (OUTPATIENT)
Dept: PAIN MANAGEMENT | Facility: CLINIC | Age: 73
End: 2023-07-19
Payer: OTHER MISCELLANEOUS

## 2023-07-19 VITALS — BODY MASS INDEX: 24.91 KG/M2 | HEIGHT: 66 IN | WEIGHT: 155 LBS

## 2023-07-19 PROCEDURE — 99213 OFFICE O/P EST LOW 20 MIN: CPT | Mod: ACP,95

## 2023-07-19 NOTE — HISTORY OF PRESENT ILLNESS
[Neck] : neck [Lower back] : lower back [Work related] : work related [Gradual] : gradual [7] : 7 [6] : 6 [Dull/Aching] : dull/aching [Radiating] : radiating [Sharp] : sharp [Constant] : constant [Household chores] : household chores [Work] : work [Sleep] : sleep [Rest] : rest [Meds] : meds [Ice] : ice [Heat] : heat [Sitting] : sitting [Standing] : standing [Walking] : walking [Bending forward] : bending forward [Stairs] : stairs [Retired] : Work status: retired [de-identified] : Chronic neck and back pain managed on  current medication regimen. She uses Soma and short acting  Tramadol prn.  Last UDT was 3- and consistent with prescribed medication. She manages a functional ADL. 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

## 2023-07-19 NOTE — REASON FOR VISIT
[Home] : at home, [unfilled] , at the time of the visit. [Medical Office: ([facility])___] : at the medical office located in  [Patient] : the patient [Self] : self [FreeTextEntry2] : MED RENEWAL WC FOLLOW UP

## 2023-07-19 NOTE — DISCUSSION/SUMMARY
[Medication Risks Reviewed] : Medication risks reviewed [de-identified] : Prescriptions renewed. [place] and [place]. SOAPP-R completed on chart. The patient's medications are documented to the best of their ability. Quality of life and functional ability improved on medications. The patient is showing no aberrant behavior or evidence of diversion. The patient was advised not to use narcotic medication while operating an automobile or heavy machinery due to potential sedation or dizziness. The patient was educated to the risks associated with potential opioid dependence and addiction. Urine toxicology screens as per office protocol. Use of multimodal analgesia used prn.\par Follow up one month.\par

## 2023-08-16 ENCOUNTER — APPOINTMENT (OUTPATIENT)
Dept: PAIN MANAGEMENT | Facility: CLINIC | Age: 73
End: 2023-08-16
Payer: OTHER MISCELLANEOUS

## 2023-08-16 VITALS — BODY MASS INDEX: 24.91 KG/M2 | HEIGHT: 66 IN | WEIGHT: 155 LBS

## 2023-08-16 PROCEDURE — 99213 OFFICE O/P EST LOW 20 MIN: CPT | Mod: ACP,95

## 2023-08-16 NOTE — HISTORY OF PRESENT ILLNESS
[Neck] : neck [Lower back] : lower back [Work related] : work related [Gradual] : gradual [8] : 8 [7] : 7 [Dull/Aching] : dull/aching [Radiating] : radiating [Sharp] : sharp [Constant] : constant [Household chores] : household chores [Work] : work [Sleep] : sleep [Rest] : rest [Meds] : meds [Ice] : ice [Heat] : heat [Sitting] : sitting [Standing] : standing [Walking] : walking [Bending forward] : bending forward [Stairs] : stairs [Retired] : Work status: retired [de-identified] : Chronic. neck and back pain stable on current medication regimen. States she uses Soma and. short acting Tramadol prn. She maintains a functional ADL. Last UDT was 3- and consistent with prescribed medication. 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

## 2023-08-16 NOTE — REASON FOR VISIT
[Home] : at home, [unfilled] , at the time of the visit. [Medical Office: ([facility])___] : at the medical office located in  [Patient] : the patient [Self] : self [FreeTextEntry2] : MED REFILL  FOLLOW UP

## 2023-08-16 NOTE — DISCUSSION/SUMMARY
[Medication Risks Reviewed] : Medication risks reviewed [de-identified] : Prescriptions renewed. [place] and [place]. SOAPP-R completed on chart. The patient's medications are documented to the best of their ability. Quality of life and functional ability improved on medications. The patient is showing no aberrant behavior or evidence of diversion. The patient was advised not to use narcotic medication while operating an automobile or heavy machinery due to potential sedation or dizziness. The patient was educated to the risks associated with potential opioid dependence and addiction. Urine toxicology screens as per office protocol. Use of multimodal analgesia used prn. Follow up one month.

## 2023-09-18 ENCOUNTER — APPOINTMENT (OUTPATIENT)
Dept: PAIN MANAGEMENT | Facility: CLINIC | Age: 73
End: 2023-09-18
Payer: OTHER MISCELLANEOUS

## 2023-09-18 VITALS — BODY MASS INDEX: 24.91 KG/M2 | HEIGHT: 66 IN | WEIGHT: 155 LBS

## 2023-09-18 PROCEDURE — 99214 OFFICE O/P EST MOD 30 MIN: CPT | Mod: ACP,95

## 2023-10-13 ENCOUNTER — APPOINTMENT (OUTPATIENT)
Dept: UROLOGY | Facility: CLINIC | Age: 73
End: 2023-10-13

## 2023-10-16 ENCOUNTER — OUTPATIENT (OUTPATIENT)
Dept: OUTPATIENT SERVICES | Facility: HOSPITAL | Age: 73
LOS: 1 days | End: 2023-10-16
Payer: MEDICARE

## 2023-10-16 ENCOUNTER — APPOINTMENT (OUTPATIENT)
Dept: UROLOGY | Facility: CLINIC | Age: 73
End: 2023-10-16
Payer: MEDICARE

## 2023-10-16 VITALS
WEIGHT: 155 LBS | SYSTOLIC BLOOD PRESSURE: 131 MMHG | HEIGHT: 66 IN | RESPIRATION RATE: 16 BRPM | HEART RATE: 87 BPM | BODY MASS INDEX: 24.91 KG/M2 | OXYGEN SATURATION: 98 % | DIASTOLIC BLOOD PRESSURE: 91 MMHG

## 2023-10-16 DIAGNOSIS — Z98.890 OTHER SPECIFIED POSTPROCEDURAL STATES: Chronic | ICD-10-CM

## 2023-10-16 DIAGNOSIS — Z86.018 PERSONAL HISTORY OF OTHER BENIGN NEOPLASM: Chronic | ICD-10-CM

## 2023-10-16 DIAGNOSIS — R35.0 FREQUENCY OF MICTURITION: ICD-10-CM

## 2023-10-16 DIAGNOSIS — Z90.49 ACQUIRED ABSENCE OF OTHER SPECIFIED PARTS OF DIGESTIVE TRACT: Chronic | ICD-10-CM

## 2023-10-16 DIAGNOSIS — N90.89 OTHER SPECIFIED NONINFLAMMATORY DISORDERS OF VULVA AND PERINEUM: Chronic | ICD-10-CM

## 2023-10-16 PROCEDURE — 52000 CYSTOURETHROSCOPY: CPT

## 2023-10-18 ENCOUNTER — APPOINTMENT (OUTPATIENT)
Dept: PAIN MANAGEMENT | Facility: CLINIC | Age: 73
End: 2023-10-18
Payer: OTHER MISCELLANEOUS

## 2023-10-18 VITALS — WEIGHT: 155 LBS | HEIGHT: 66 IN | BODY MASS INDEX: 24.91 KG/M2

## 2023-10-18 LAB — URINE CYTOLOGY: NORMAL

## 2023-10-18 PROCEDURE — 99213 OFFICE O/P EST LOW 20 MIN: CPT | Mod: ACP,95

## 2023-10-26 DIAGNOSIS — C67.0 MALIGNANT NEOPLASM OF TRIGONE OF BLADDER: ICD-10-CM

## 2023-11-10 ENCOUNTER — APPOINTMENT (OUTPATIENT)
Dept: PAIN MANAGEMENT | Facility: CLINIC | Age: 73
End: 2023-11-10
Payer: OTHER MISCELLANEOUS

## 2023-11-10 PROCEDURE — 99213 OFFICE O/P EST LOW 20 MIN: CPT

## 2023-12-11 LAB
25(OH)D3 SERPL-MCNC: 56.9 NG/ML
ALBUMIN SERPL ELPH-MCNC: 4.7 G/DL
ALP BLD-CCNC: 107 U/L
ALT SERPL-CCNC: 20 U/L
ANION GAP SERPL CALC-SCNC: 13 MMOL/L
APPEARANCE: CLEAR
AST SERPL-CCNC: 25 U/L
BACTERIA: NEGATIVE
BASOPHILS # BLD AUTO: 0.04 K/UL
BASOPHILS NFR BLD AUTO: 0.5 %
BILIRUB SERPL-MCNC: 0.6 MG/DL
BILIRUBIN URINE: NEGATIVE
BLOOD URINE: NEGATIVE
BUN SERPL-MCNC: 17 MG/DL
CALCIUM SERPL-MCNC: 10.4 MG/DL
CHLORIDE SERPL-SCNC: 98 MMOL/L
CHOLEST SERPL-MCNC: 246 MG/DL
CO2 SERPL-SCNC: 27 MMOL/L
COLOR: YELLOW
CREAT SERPL-MCNC: 0.96 MG/DL
EGFR: 64 ML/MIN/1.73M2
EOSINOPHIL # BLD AUTO: 0.04 K/UL
EOSINOPHIL NFR BLD AUTO: 0.5 %
ESTIMATED AVERAGE GLUCOSE: 117 MG/DL
GLUCOSE QUALITATIVE U: NEGATIVE
GLUCOSE SERPL-MCNC: 94 MG/DL
HBA1C MFR BLD HPLC: 5.7 %
HCT VFR BLD CALC: 42.9 %
HDLC SERPL-MCNC: 59 MG/DL
HGB BLD-MCNC: 15 G/DL
HYALINE CASTS: 0 /LPF
IMM GRANULOCYTES NFR BLD AUTO: 0.3 %
KETONES URINE: NEGATIVE
LDLC SERPL CALC-MCNC: 156 MG/DL
LEUKOCYTE ESTERASE URINE: NEGATIVE
LYMPHOCYTES # BLD AUTO: 2.42 K/UL
LYMPHOCYTES NFR BLD AUTO: 27.7 %
MAN DIFF?: NORMAL
MCHC RBC-ENTMCNC: 31.5 PG
MCHC RBC-ENTMCNC: 35 GM/DL
MCV RBC AUTO: 90.1 FL
MICROSCOPIC-UA: NORMAL
MONOCYTES # BLD AUTO: 0.63 K/UL
MONOCYTES NFR BLD AUTO: 7.2 %
NEUTROPHILS # BLD AUTO: 5.58 K/UL
NEUTROPHILS NFR BLD AUTO: 63.8 %
NITRITE URINE: NEGATIVE
NONHDLC SERPL-MCNC: 186 MG/DL
PH URINE: 7
PLATELET # BLD AUTO: 331 K/UL
POTASSIUM SERPL-SCNC: 4.5 MMOL/L
PROT SERPL-MCNC: 7.5 G/DL
PROTEIN URINE: NORMAL
RBC # BLD: 4.76 M/UL
RBC # FLD: 13.1 %
RED BLOOD CELLS URINE: 2 /HPF
SODIUM SERPL-SCNC: 137 MMOL/L
SPECIFIC GRAVITY URINE: 1.02
SQUAMOUS EPITHELIAL CELLS: 2 /HPF
T4 FREE SERPL-MCNC: 1.1 NG/DL
TRIGL SERPL-MCNC: 155 MG/DL
TSH SERPL-ACNC: 1.39 UIU/ML
UROBILINOGEN URINE: NORMAL
WBC # FLD AUTO: 8.74 K/UL
WHITE BLOOD CELLS URINE: 2 /HPF

## 2023-12-13 ENCOUNTER — RX RENEWAL (OUTPATIENT)
Age: 73
End: 2023-12-13

## 2023-12-15 ENCOUNTER — APPOINTMENT (OUTPATIENT)
Dept: PAIN MANAGEMENT | Facility: CLINIC | Age: 73
End: 2023-12-15
Payer: OTHER MISCELLANEOUS

## 2023-12-15 PROCEDURE — 99213 OFFICE O/P EST LOW 20 MIN: CPT | Mod: 95

## 2023-12-15 NOTE — ASSESSMENT
[FreeTextEntry1] : Interim history Patient contact the office due to the fact that she was denied her 200 mg tramadol extended release pill.  Patient has been taking this medication consistently for more than a couple of years.  There has not been issues in the past with it being approved but this month it was not approved and the patient had a pay cash for the medication.  She obtained a small supply would both like us to put her through the portal again to see if the medication that has been approved consistently in the past would be approved this time.  I did explain the process to the patient of the portal and the fact that we can just put in the information as requested but the decision is the workman comp for on whether the medication that we feel is medically necessary for the patient will be approved. Objective There are no new imaging studies.  Patient states that her physical status is unchanged. Assessment  chronic lumbar and cervical radiculopathy Plan Patient will be prescribed 200 mg extended release tramadol and we will see whether the Workmen's Comp. board will allow the patient to take her medication that they have approved consistently over the last couple of years

## 2023-12-15 NOTE — HISTORY OF PRESENT ILLNESS
[Mid-back] : mid-back [Lower back] : lower back [8] : 8 [9] : 9 [Burning] : burning [Sharp] : sharp [Shooting] : shooting [Constant] : constant [Household chores] : household chores [Rest] : rest [Meds] : meds [Retired] : Work status: retired [Home] : at home, [unfilled] , at the time of the visit. [Medical Office: ([facility])___] : at the medical office located in  [Verbal consent obtained from patient] : the patient, [unfilled] [FreeTextEntry1] : LAUREN ALARCON IS FOLLOWING UP FOR PAIN MED REFILL, THERE HAS NOT BEEN CHANGES IN PAIN SINCE LAST VISIT.   LAST UDS:11/10/2023 [] : Post Surgical Visit: no [FreeTextEntry3] : 10 [birth year] 2008 [FreeTextEntry7] : [facility] KNEE  [de-identified] : HUGO

## 2023-12-18 ENCOUNTER — APPOINTMENT (OUTPATIENT)
Dept: INTERNAL MEDICINE | Facility: CLINIC | Age: 73
End: 2023-12-18
Payer: MEDICARE

## 2023-12-18 VITALS
WEIGHT: 155 LBS | OXYGEN SATURATION: 97 % | TEMPERATURE: 97.8 F | BODY MASS INDEX: 24.91 KG/M2 | HEIGHT: 66 IN | HEART RATE: 70 BPM

## 2023-12-18 DIAGNOSIS — L08.9 FOLLICULAR CYST OF THE SKIN AND SUBCUTANEOUS TISSUE, UNSPECIFIED: ICD-10-CM

## 2023-12-18 DIAGNOSIS — L72.9 FOLLICULAR CYST OF THE SKIN AND SUBCUTANEOUS TISSUE, UNSPECIFIED: ICD-10-CM

## 2023-12-18 PROCEDURE — 99213 OFFICE O/P EST LOW 20 MIN: CPT | Mod: 25

## 2023-12-18 NOTE — HISTORY OF PRESENT ILLNESS
[FreeTextEntry8] : The patient has an upper back lump which she noticed recently.  She's not sure how long it was there.  In the last week it became larger and painful and it "opened up" with pus coming out.  No fever or systemic symptoms.  Not much discharge now.  It is still red and mildly tender.

## 2023-12-18 NOTE — PHYSICAL EXAM
[Normal] : no acute distress, well nourished, well developed and well-appearing [de-identified] : upper back 1-2 cm reddish lump, mildly tender with a small amoung of white discharge with squeezing.

## 2023-12-18 NOTE — ASSESSMENT
[FreeTextEntry1] : She has an upper back skin lesion, likely an infected cyst.  Will start Cephalexin TID.  She was advised to see a surgeon.  It likely should be opened and drained.  Call PRN.  She is returning for a CPE soon.

## 2024-01-11 ENCOUNTER — APPOINTMENT (OUTPATIENT)
Dept: INTERNAL MEDICINE | Facility: CLINIC | Age: 74
End: 2024-01-11
Payer: MEDICARE

## 2024-01-11 ENCOUNTER — NON-APPOINTMENT (OUTPATIENT)
Age: 74
End: 2024-01-11

## 2024-01-11 VITALS
WEIGHT: 155 LBS | TEMPERATURE: 98 F | HEART RATE: 77 BPM | BODY MASS INDEX: 25.52 KG/M2 | HEIGHT: 65.5 IN | OXYGEN SATURATION: 99 %

## 2024-01-11 DIAGNOSIS — R39.89 OTHER SYMPTOMS AND SIGNS INVOLVING THE GENITOURINARY SYSTEM: ICD-10-CM

## 2024-01-11 DIAGNOSIS — Z00.00 ENCOUNTER FOR GENERAL ADULT MEDICAL EXAMINATION W/OUT ABNORMAL FINDINGS: ICD-10-CM

## 2024-01-11 DIAGNOSIS — I10 ESSENTIAL (PRIMARY) HYPERTENSION: ICD-10-CM

## 2024-01-11 DIAGNOSIS — C67.0 MALIGNANT NEOPLASM OF TRIGONE OF BLADDER: ICD-10-CM

## 2024-01-11 DIAGNOSIS — R07.89 OTHER CHEST PAIN: ICD-10-CM

## 2024-01-11 PROCEDURE — 36415 COLL VENOUS BLD VENIPUNCTURE: CPT

## 2024-01-11 PROCEDURE — 93000 ELECTROCARDIOGRAM COMPLETE: CPT

## 2024-01-11 PROCEDURE — G0008: CPT

## 2024-01-11 PROCEDURE — 90686 IIV4 VACC NO PRSV 0.5 ML IM: CPT

## 2024-01-11 PROCEDURE — G0439: CPT

## 2024-01-11 RX ORDER — CEPHALEXIN 500 MG/1
500 TABLET ORAL 3 TIMES DAILY
Qty: 30 | Refills: 0 | Status: DISCONTINUED | COMMUNITY
Start: 2023-12-18 | End: 2024-01-11

## 2024-01-11 RX ORDER — ALFUZOSIN HYDROCHLORIDE 10 MG/1
10 TABLET, EXTENDED RELEASE ORAL
Qty: 30 | Refills: 3 | Status: DISCONTINUED | COMMUNITY
Start: 2021-10-19 | End: 2024-01-11

## 2024-01-11 RX ORDER — TRAMADOL HYDROCHLORIDE 200 MG/1
200 TABLET, EXTENDED RELEASE ORAL DAILY
Refills: 0 | Status: DISCONTINUED | COMMUNITY
End: 2024-01-11

## 2024-01-12 ENCOUNTER — APPOINTMENT (OUTPATIENT)
Dept: PAIN MANAGEMENT | Facility: CLINIC | Age: 74
End: 2024-01-12

## 2024-01-12 VITALS — SYSTOLIC BLOOD PRESSURE: 122 MMHG | DIASTOLIC BLOOD PRESSURE: 75 MMHG

## 2024-01-12 NOTE — HISTORY OF PRESENT ILLNESS
[FreeTextEntry1] : The patient is here for a routine visit.  [de-identified] : She has chronic pain and she sees pain management.  She does some exercise.  Diet is fair.  She had an episode of chest pain a few days ago that lasted for a few hours and resolved.  No pain now.  It wasn't related to exertion.  No dyspnea, orthopnea, cough, fever. No change with changing position.  No affect from eating or swallowing. It extended from the left axilla to left chest and back.    She hasn't had it before.    No  symptoms.    The back skin lesion is improved and less tender but still present.  There is a subcutaneous lesion, slightly tender.  Not red or hot.  There had been some pus that came out at one point but no discharge now. It improved with antibiotics.

## 2024-01-12 NOTE — PHYSICAL EXAM
[No Acute Distress] : no acute distress [Well Nourished] : well nourished [Well-Appearing] : well-appearing [Well Developed] : well developed [Normal Sclera/Conjunctiva] : normal sclera/conjunctiva [PERRL] : pupils equal round and reactive to light [EOMI] : extraocular movements intact [Normal Outer Ear/Nose] : the outer ears and nose were normal in appearance [Normal Oropharynx] : the oropharynx was normal [No JVD] : no jugular venous distention [No Lymphadenopathy] : no lymphadenopathy [Supple] : supple [Thyroid Normal, No Nodules] : the thyroid was normal and there were no nodules present [No Respiratory Distress] : no respiratory distress  [No Accessory Muscle Use] : no accessory muscle use [Normal Rate] : normal rate  [Clear to Auscultation] : lungs were clear to auscultation bilaterally [Regular Rhythm] : with a regular rhythm [Normal S1, S2] : normal S1 and S2 [No Murmur] : no murmur heard [No Carotid Bruits] : no carotid bruits [No Abdominal Bruit] : a ~M bruit was not heard ~T in the abdomen [No Varicosities] : no varicosities [No Edema] : there was no peripheral edema [Pedal Pulses Present] : the pedal pulses are present [No Palpable Aorta] : no palpable aorta [No Extremity Clubbing/Cyanosis] : no extremity clubbing/cyanosis [Normal Appearance] : normal in appearance [No Nipple Discharge] : no nipple discharge [Soft] : abdomen soft [No Axillary Lymphadenopathy] : no axillary lymphadenopathy [Non Tender] : non-tender [Non-distended] : non-distended [No Masses] : no abdominal mass palpated [No HSM] : no HSM [Normal Bowel Sounds] : normal bowel sounds [Normal Posterior Cervical Nodes] : no posterior cervical lymphadenopathy [Normal Anterior Cervical Nodes] : no anterior cervical lymphadenopathy [No CVA Tenderness] : no CVA  tenderness [No Spinal Tenderness] : no spinal tenderness [Grossly Normal Strength/Tone] : grossly normal strength/tone [No Joint Swelling] : no joint swelling [No Rash] : no rash [Coordination Grossly Intact] : coordination grossly intact [No Focal Deficits] : no focal deficits [Normal Gait] : normal gait [Deep Tendon Reflexes (DTR)] : deep tendon reflexes were 2+ and symmetric [Normal Affect] : the affect was normal [Normal Insight/Judgement] : insight and judgment were intact

## 2024-01-12 NOTE — ASSESSMENT
[FreeTextEntry1] : The BP is fine at 122/75.  Discussed diet and exercise.  Check lipids which have been fair.  She had an infected back skin cyst which is improved s/p antibiotics.  She had been advised to see a surgeon to see if it should be drained or removed but she didn't pursue that and it was again advised.  Mammogram, DEXA and gyn visit all very overdue and advised.  Colonoscopy again urged.  Flu vaccine today.    She had the COVID-19 vaccine but no boosters and the new vaccine was advised.  She hasn't seen an ophthalmologist or dermatologist and it was advised.  She had an episode of chest pain a few days ago.  No symptoms now.  It was somewhat atypical, bot related to exertion.  She feels well now and the EKG is unremarkable.  She was advised to see a cardiologist to evaluate and the need to go soon was discussed and emphasized.  If it recurs, she should go to the ER.  ASA 81 mg QD advised for now.   She saw her urologist in 10/23 and is UTD.   S/p Prevnar 13.  Prevnar 20 advised next time.    Meghan advised at pharmacy.    Check TFTs on Levothyroxine.

## 2024-01-12 NOTE — HEALTH RISK ASSESSMENT
[No falls in past year] : Patient reported no falls in the past year [Fully functional (bathing, dressing, toileting, transferring, walking, feeding)] : Fully functional (bathing, dressing, toileting, transferring, walking, feeding) [Fully functional (using the telephone, shopping, preparing meals, housekeeping, doing laundry, using] : Fully functional and needs no help or supervision to perform IADLs (using the telephone, shopping, preparing meals, housekeeping, doing laundry, using transportation, managing medications and managing finances) [Reports changes in hearing] : Reports no changes in hearing [Reports changes in vision] : Reports no changes in vision [Reports changes in dental health] : Reports no changes in dental health

## 2024-01-19 ENCOUNTER — APPOINTMENT (OUTPATIENT)
Dept: PAIN MANAGEMENT | Facility: CLINIC | Age: 74
End: 2024-01-19
Payer: OTHER MISCELLANEOUS

## 2024-01-19 PROCEDURE — 99214 OFFICE O/P EST MOD 30 MIN: CPT | Mod: ACP,95

## 2024-01-19 NOTE — HISTORY OF PRESENT ILLNESS
[Neck] : neck [Lower back] : lower back [Work related] : work related [Gradual] : gradual [10] : 10 [9] : 9 [Dull/Aching] : dull/aching [Radiating] : radiating [Sharp] : sharp [Constant] : constant [Household chores] : household chores [Work] : work [Sleep] : sleep [Rest] : rest [Meds] : meds [Ice] : ice [Heat] : heat [Sitting] : sitting [Standing] : standing [Walking] : walking [Bending forward] : bending forward [Stairs] : stairs [Retired] : Work status: retired [] : This patient has had an injection before: no [FreeTextEntry1] : B/L KNEES  [FreeTextEntry3] : 10/10/2008 [FreeTextEntry6] : SCIATICA LEFT LEG  [FreeTextEntry7] : INTO B/L KNEES [de-identified] : PT WENT FOR PHYSICAL THERAPY FOR 6 YEARS AND A CHIROPRACTOR AND AS PER 2024-01-19 CONTINUES STRETCHING 5-7X 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

## 2024-01-19 NOTE — DISCUSSION/SUMMARY
[Medication Risks Reviewed] : Medication risks reviewed [de-identified] : Prescriptions renewed. [place] and [place]. SOAPP-R completed on chart. The patient's medications are documented to the best of their ability. Quality of life and functional ability improved on medications. The patient is showing no aberrant behavior or evidence of diversion. The patient was advised not to use narcotic medication while operating an automobile or heavy machinery due to potential sedation or dizziness. The patient was educated to the risks associated with potential opioid dependence and addiction. Urine toxicology screens as per office protocol. Use of multimodal analgesia used prn. Follow up one month.

## 2024-01-19 NOTE — REASON FOR VISIT
[Follow-Up Visit] : a follow-up pain management visit [Home] : at home, [unfilled] , at the time of the visit. [Medical Office: ([facility])___] : at the medical office located in  [Patient] : the patient [Self] : self [FreeTextEntry2] : med refill  follow up

## 2024-02-20 ENCOUNTER — APPOINTMENT (OUTPATIENT)
Dept: PAIN MANAGEMENT | Facility: CLINIC | Age: 74
End: 2024-02-20
Payer: OTHER MISCELLANEOUS

## 2024-02-20 PROCEDURE — 99213 OFFICE O/P EST LOW 20 MIN: CPT | Mod: ACP

## 2024-02-20 RX ORDER — NALOXONE HYDROCHLORIDE 4 MG/.1ML
4 SPRAY NASAL
Qty: 1 | Refills: 0 | Status: ACTIVE | COMMUNITY
Start: 2023-09-18 | End: 1900-01-01

## 2024-02-20 NOTE — HISTORY OF PRESENT ILLNESS
[Neck] : neck [Lower back] : lower back [Work related] : work related [Gradual] : gradual [10] : 10 [9] : 9 [Dull/Aching] : dull/aching [Radiating] : radiating [Sharp] : sharp [Constant] : constant [Household chores] : household chores [Work] : work [Sleep] : sleep [Rest] : rest [Meds] : meds [Ice] : ice [Heat] : heat [Sitting] : sitting [Standing] : standing [Walking] : walking [Bending forward] : bending forward [Stairs] : stairs [Retired] : Work status: retired [] : This patient has had an injection before: no [FreeTextEntry1] : B/L KNEES  [FreeTextEntry3] : 10/10/2008 [FreeTextEntry6] : SCIATICA LEFT LEG  [FreeTextEntry7] : INTO B/L KNEES [de-identified] : PT WENT FOR PHYSICAL THERAPY FOR 6 YEARS AND A CHIROPRACTOR AND AS PER 2024-02-20 CONTINUES STRETCHING 5-7X 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

## 2024-02-20 NOTE — DISCUSSION/SUMMARY
[Medication Risks Reviewed] : Medication risks reviewed [de-identified] : Prescriptions renewed. [place] and [place]. SOAPP-R completed on chart. The patient's medications are documented to the best of their ability. Quality of life and functional ability improved on medications. The patient is showing no aberrant behavior or evidence of diversion. The patient was advised not to use narcotic medication while operating an automobile or heavy machinery due to potential sedation or dizziness. The patient was educated to the risks associated with potential opioid dependence and addiction. Urine toxicology screens as per office protocol. Use of multimodal analgesia used prn. Follow up one month.

## 2024-03-10 ENCOUNTER — RX RENEWAL (OUTPATIENT)
Age: 74
End: 2024-03-10

## 2024-03-10 ENCOUNTER — NON-APPOINTMENT (OUTPATIENT)
Age: 74
End: 2024-03-10

## 2024-03-10 LAB
25(OH)D3 SERPL-MCNC: 100 NG/ML
ALBUMIN SERPL ELPH-MCNC: 4.1 G/DL
ALP BLD-CCNC: 101 U/L
ALT SERPL-CCNC: 21 U/L
ANION GAP SERPL CALC-SCNC: 18 MMOL/L
AST SERPL-CCNC: 33 U/L
BILIRUB SERPL-MCNC: 0.3 MG/DL
BUN SERPL-MCNC: 17 MG/DL
CALCIUM SERPL-MCNC: 10.3 MG/DL
CHLORIDE SERPL-SCNC: 100 MMOL/L
CHOLEST SERPL-MCNC: 270 MG/DL
CO2 SERPL-SCNC: 20 MMOL/L
CREAT SERPL-MCNC: 0.97 MG/DL
EGFR: 62 ML/MIN/1.73M2
ESTIMATED AVERAGE GLUCOSE: 114 MG/DL
GLUCOSE SERPL-MCNC: 99 MG/DL
HBA1C MFR BLD HPLC: 5.6 %
HCT VFR BLD CALC: 43.7 %
HDLC SERPL-MCNC: 59 MG/DL
HGB BLD-MCNC: 14.2 G/DL
LDLC SERPL CALC-MCNC: 185 MG/DL
MCHC RBC-ENTMCNC: 30.7 PG
MCHC RBC-ENTMCNC: 32.5 GM/DL
MCV RBC AUTO: 94.6 FL
NONHDLC SERPL-MCNC: 211 MG/DL
PLATELET # BLD AUTO: 309 K/UL
POTASSIUM SERPL-SCNC: 4.2 MMOL/L
PROT SERPL-MCNC: 7.3 G/DL
RBC # BLD: 4.62 M/UL
RBC # FLD: 13.3 %
SODIUM SERPL-SCNC: 138 MMOL/L
T4 FREE SERPL-MCNC: 1.2 NG/DL
TRIGL SERPL-MCNC: 144 MG/DL
TSH SERPL-ACNC: 2.33 UIU/ML
WBC # FLD AUTO: 8.14 K/UL

## 2024-03-18 ENCOUNTER — APPOINTMENT (OUTPATIENT)
Dept: PAIN MANAGEMENT | Facility: CLINIC | Age: 74
End: 2024-03-18
Payer: OTHER MISCELLANEOUS

## 2024-03-18 PROCEDURE — 99214 OFFICE O/P EST MOD 30 MIN: CPT | Mod: ACP

## 2024-03-18 NOTE — DISCUSSION/SUMMARY
[Medication Risks Reviewed] : Medication risks reviewed [de-identified] : Prescriptions renewed. [place] and [place]. SOAPP-R completed on chart. The patient's medications are documented to the best of their ability. Quality of life and functional ability improved on medications. The patient is showing no aberrant behavior or evidence of diversion. The patient was advised not to use narcotic medication while operating an automobile or heavy machinery due to potential sedation or dizziness. The patient was educated to the risks associated with potential opioid dependence and addiction. Urine toxicology screens as per office protocol. Use of multimodal analgesia used prn. Follow up one month.

## 2024-03-18 NOTE — HISTORY OF PRESENT ILLNESS
[Neck] : neck [Lower back] : lower back [Work related] : work related [7] : 7 [Leisure] : leisure [Sleep] : sleep [Meds] : meds [Standing] : standing [Walking] : walking [Bending forward] : bending forward [Disabled] : Work status: disabled [] : no [FreeTextEntry1] : OJ KNEES [FreeTextEntry3] : 10.10.08

## 2024-03-18 NOTE — REASON FOR VISIT
[Follow-Up Visit] : a follow-up pain management visit [Home] : at home, [unfilled] , at the time of the visit. [Medical Office: ([facility])___] : at the medical office located in  [Patient] : the patient [Self] : self

## 2024-04-17 ENCOUNTER — APPOINTMENT (OUTPATIENT)
Dept: PAIN MANAGEMENT | Facility: CLINIC | Age: 74
End: 2024-04-17
Payer: OTHER MISCELLANEOUS

## 2024-04-17 PROCEDURE — 99214 OFFICE O/P EST MOD 30 MIN: CPT | Mod: ACP

## 2024-04-17 NOTE — DISCUSSION/SUMMARY
[Medication Risks Reviewed] : Medication risks reviewed [de-identified] : Prescriptions renewed. [place] and [place]. SOAPP-R completed on chart. The patient's medications are documented to the best of their ability. Quality of life and functional ability improved on medications. The patient is showing no aberrant behavior or evidence of diversion. The patient was advised not to use narcotic medication while operating an automobile or heavy machinery due to potential sedation or dizziness. The patient was educated to the risks associated with potential opioid dependence and addiction. Urine toxicology screens as per office protocol. Use of multimodal analgesia used prn. Follow up one month.

## 2024-04-17 NOTE — HISTORY OF PRESENT ILLNESS
[Neck] : neck [Lower back] : lower back [Work related] : work related [10] : 10 [5] : 5 [Dull/Aching] : dull/aching [Sharp] : sharp [Constant] : constant [Rest] : rest [Meds] : meds [Bending forward] : bending forward [Not working due to injury] : Work status: not working due to injury [] : no [FreeTextEntry1] : OJ KNEES [FreeTextEntry3] : 10.10.08 [de-identified] : LIFTING [de-identified] : PT DOES A HOME EXCERCISE PROGRAM

## 2024-04-17 NOTE — REASON FOR VISIT
[Follow-Up Visit] : a follow-up pain management visit [Home] : at home, [unfilled] , at the time of the visit. [Medical Office: ([facility])___] : at the medical office located in  [Patient] : the patient [Self] : self

## 2024-05-14 ENCOUNTER — APPOINTMENT (OUTPATIENT)
Dept: INTERNAL MEDICINE | Facility: CLINIC | Age: 74
End: 2024-05-14
Payer: MEDICARE

## 2024-05-14 VITALS
BODY MASS INDEX: 25.83 KG/M2 | TEMPERATURE: 97.7 F | WEIGHT: 155 LBS | OXYGEN SATURATION: 99 % | HEIGHT: 65 IN | HEART RATE: 61 BPM

## 2024-05-14 DIAGNOSIS — E78.5 HYPERLIPIDEMIA, UNSPECIFIED: ICD-10-CM

## 2024-05-14 PROCEDURE — 99213 OFFICE O/P EST LOW 20 MIN: CPT

## 2024-05-14 RX ORDER — ROSUVASTATIN CALCIUM 10 MG/1
10 TABLET, FILM COATED ORAL
Qty: 90 | Refills: 3 | Status: DISCONTINUED | COMMUNITY
Start: 2024-03-11 | End: 2024-05-14

## 2024-05-14 RX ORDER — ATORVASTATIN CALCIUM 10 MG/1
10 TABLET, FILM COATED ORAL
Qty: 90 | Refills: 1 | Status: DISCONTINUED | COMMUNITY
Start: 2024-04-24 | End: 2024-05-14

## 2024-05-15 ENCOUNTER — APPOINTMENT (OUTPATIENT)
Dept: PAIN MANAGEMENT | Facility: CLINIC | Age: 74
End: 2024-05-15
Payer: OTHER MISCELLANEOUS

## 2024-05-15 DIAGNOSIS — M54.16 RADICULOPATHY, LUMBAR REGION: ICD-10-CM

## 2024-05-15 PROBLEM — E78.5 HYPERLIPIDEMIA: Status: ACTIVE | Noted: 2024-03-11

## 2024-05-15 PROCEDURE — 99214 OFFICE O/P EST MOD 30 MIN: CPT | Mod: ACP

## 2024-05-15 NOTE — REASON FOR VISIT
[Follow-Up Visit] : a follow-up pain management visit [Home] : at home, [unfilled] , at the time of the visit. [Medical Office: ([facility])___] : at the medical office located in  [Patient] : the patient [Self] : self

## 2024-05-15 NOTE — HISTORY OF PRESENT ILLNESS
[Upper back] : upper back [Mid-back] : mid-back [Lower back] : lower back [Work related] : work related [10] : 10 [Dull/Aching] : dull/aching [Tightness] : tightness [Constant] : constant [Sleep] : sleep [Meds] : meds [Nothing helps with pain getting better] : Nothing helps with pain getting better [Not working due to injury] : Work status: not working due to injury [FreeTextEntry1] :   Neck and back pain vary. She manages on current pain med regimen. Last UDT was 2- and consistent with prescribed medication..  Maintains a functional ADL. She uses Soma and short acting Tramadol prn.  [] : This patient has had an injection before: no [FreeTextEntry3] : 10.10.08 [FreeTextEntry6] : SORENESS - PT EXTRA SORE DUE TO NEW CHOLESTEROL MEDICATION SIDE EFFECTS

## 2024-05-15 NOTE — DISCUSSION/SUMMARY
[Medication Risks Reviewed] : Medication risks reviewed [de-identified] : Prescriptions renewed. [place] and [place]. SOAPP-R completed on chart. The patient's medications are documented to the best of their ability. Quality of life and functional ability improved on medications. The patient is showing no aberrant behavior or evidence of diversion. The patient was advised not to use narcotic medication while operating an automobile or heavy machinery due to potential sedation or dizziness. The patient was educated to the risks associated with potential opioid dependence and addiction. Urine toxicology screens as per office protocol. Use of multimodal analgesia used prn. Follow up one month.

## 2024-05-15 NOTE — ASSESSMENT
[FreeTextEntry1] : The patient has had worse pains with Rosuvastatin and Atorvastatin.  We discussed the options.  Will try Pravastatin 10 mg to see if she tolerates it.  If now, would consider Zetia.  Also discussed the option of Repatha or Praluent which she agrees to consider if the oral options aren't tolerated.    Diet discussed.   Call PRN if she has symptoms.  Otherwise, return in 2-3 months to check blood tests.    She has decreased the Vitamin D supplement.

## 2024-05-15 NOTE — HISTORY OF PRESENT ILLNESS
[de-identified] : The patient comes in to discuss lipids.  She says she had much worse pains with taking the Atorvastatin, even at a low dose.  She had similar symptoms with Rosuvastatin.   She has now stopped the Atorvastatin.  She tries to watch her diet which she says is fairy good.  She doesn't exercise.  She sees pain management.

## 2024-06-01 RX ORDER — PRAVASTATIN SODIUM 10 MG/1
10 TABLET ORAL
Qty: 90 | Refills: 3 | Status: DISCONTINUED | COMMUNITY
Start: 2024-05-14 | End: 2024-06-01

## 2024-06-01 RX ORDER — EZETIMIBE 10 MG/1
10 TABLET ORAL
Qty: 90 | Refills: 3 | Status: ACTIVE | COMMUNITY
Start: 2024-06-01 | End: 1900-01-01

## 2024-06-06 ENCOUNTER — RX RENEWAL (OUTPATIENT)
Age: 74
End: 2024-06-06

## 2024-06-11 RX ORDER — LEVOTHYROXINE SODIUM 0.03 MG/1
25 TABLET ORAL
Qty: 90 | Refills: 3 | Status: ACTIVE | COMMUNITY
Start: 2017-11-20 | End: 1900-01-01

## 2024-06-11 RX ORDER — HYDROCHLOROTHIAZIDE 12.5 MG/1
12.5 TABLET ORAL DAILY
Qty: 90 | Refills: 3 | Status: ACTIVE | COMMUNITY
Start: 2021-03-18 | End: 1900-01-01

## 2024-06-11 RX ORDER — LOSARTAN POTASSIUM 50 MG/1
50 TABLET, FILM COATED ORAL
Qty: 90 | Refills: 3 | Status: ACTIVE | COMMUNITY
Start: 2018-08-03 | End: 1900-01-01

## 2024-06-24 ENCOUNTER — APPOINTMENT (OUTPATIENT)
Dept: PAIN MANAGEMENT | Facility: CLINIC | Age: 74
End: 2024-06-24
Payer: OTHER MISCELLANEOUS

## 2024-06-24 DIAGNOSIS — M54.12 RADICULOPATHY, CERVICAL REGION: ICD-10-CM

## 2024-06-24 PROCEDURE — 99213 OFFICE O/P EST LOW 20 MIN: CPT

## 2024-06-24 RX ORDER — TRAMADOL HYDROCHLORIDE 50 MG/1
50 TABLET, COATED ORAL
Qty: 60 | Refills: 0 | Status: ACTIVE | COMMUNITY
Start: 1900-01-01 | End: 1900-01-01

## 2024-06-24 RX ORDER — CARISOPRODOL 350 MG/1
350 TABLET ORAL
Qty: 60 | Refills: 0 | Status: ACTIVE | COMMUNITY
Start: 1900-01-01 | End: 1900-01-01

## 2024-06-24 RX ORDER — TRAMADOL HYDROCHLORIDE 200 MG/1
200 TABLET, EXTENDED RELEASE ORAL
Qty: 30 | Refills: 0 | Status: ACTIVE | COMMUNITY
Start: 2023-05-08 | End: 1900-01-01

## 2024-07-24 ENCOUNTER — APPOINTMENT (OUTPATIENT)
Dept: PAIN MANAGEMENT | Facility: CLINIC | Age: 74
End: 2024-07-24
Payer: OTHER MISCELLANEOUS

## 2024-07-24 DIAGNOSIS — M54.16 RADICULOPATHY, LUMBAR REGION: ICD-10-CM

## 2024-07-24 PROCEDURE — 99213 OFFICE O/P EST LOW 20 MIN: CPT

## 2024-07-24 NOTE — HISTORY OF PRESENT ILLNESS
[Neck] : neck [Upper back] : upper back [Mid-back] : mid-back [Lower back] : lower back [Work related] : work related [10] : 10 [Dull/Aching] : dull/aching [Sharp] : sharp [Throbbing] : throbbing [Tightness] : tightness [Constant] : constant [Sleep] : sleep [Meds] : meds [Nothing helps with pain getting better] : Nothing helps with pain getting better [Sitting] : sitting [Standing] : standing [Bending forward] : bending forward [Walking] : walking [Not working due to injury] : Work status: not working due to injury [Home] : at home, [unfilled] , at the time of the visit. [Medical Office: ([facility])___] : at the medical office located in  [Verbal consent obtained from patient] : the patient, [unfilled] [] : This patient has had an injection before: no [FreeTextEntry3] : 10.10.08 [FreeTextEntry6] : SORENESS - PT EXTRA SORE DUE TO NEW CHOLESTEROL MEDICATION SIDE EFFECTS

## 2024-08-13 ENCOUNTER — APPOINTMENT (OUTPATIENT)
Dept: PAIN MANAGEMENT | Facility: CLINIC | Age: 74
End: 2024-08-13

## 2024-08-20 ENCOUNTER — APPOINTMENT (OUTPATIENT)
Dept: PAIN MANAGEMENT | Facility: CLINIC | Age: 74
End: 2024-08-20
Payer: OTHER MISCELLANEOUS

## 2024-08-20 VITALS — HEIGHT: 65 IN | BODY MASS INDEX: 25.83 KG/M2 | WEIGHT: 155 LBS

## 2024-08-20 DIAGNOSIS — M54.12 RADICULOPATHY, CERVICAL REGION: ICD-10-CM

## 2024-08-20 DIAGNOSIS — M54.16 RADICULOPATHY, LUMBAR REGION: ICD-10-CM

## 2024-08-20 PROCEDURE — 99213 OFFICE O/P EST LOW 20 MIN: CPT | Mod: ACP

## 2024-08-20 NOTE — DISCUSSION/SUMMARY
[Medication Risks Reviewed] : Medication risks reviewed [de-identified] : Prescriptions renewed. [place] and [place]. SOAPP-R completed on chart. The patient's medications are documented to the best of their ability. Quality of life and functional ability improved on medications. The patient is showing no aberrant behavior or evidence of diversion. The patient was advised not to use narcotic medication while operating an automobile or heavy machinery due to potential sedation or dizziness. The patient was educated to the risks associated with potential opioid dependence and addiction. Urine toxicology screens as per office protocol. Use of multimodal analgesia used prn. Follow up one month.

## 2024-08-20 NOTE — HISTORY OF PRESENT ILLNESS
[Neck] : neck [Upper back] : upper back [Mid-back] : mid-back [Lower back] : lower back [Work related] : work related [10] : 10 [9] : 9 [Dull/Aching] : dull/aching [Sharp] : sharp [Throbbing] : throbbing [Tightness] : tightness [Constant] : constant [Sleep] : sleep [Meds] : meds [Nothing helps with pain getting better] : Nothing helps with pain getting better [Sitting] : sitting [Standing] : standing [Walking] : walking [Bending forward] : bending forward [Not working due to injury] : Work status: not working due to injury [FreeTextEntry1] : Chronic neck and back pain stable. She manages well with current pain medication regimen. Last UDT was 6- and consistent with prescribed medication..  Maintains a functional ADL. [] : This patient has had an injection before: no [FreeTextEntry3] : 10.10.08 [FreeTextEntry6] : SORENESS - PT EXTRA SORE DUE TO NEW CHOLESTEROL MEDICATION SIDE EFFECTS

## 2024-09-18 ENCOUNTER — APPOINTMENT (OUTPATIENT)
Dept: PAIN MANAGEMENT | Facility: CLINIC | Age: 74
End: 2024-09-18
Payer: OTHER MISCELLANEOUS

## 2024-09-18 DIAGNOSIS — M54.16 RADICULOPATHY, LUMBAR REGION: ICD-10-CM

## 2024-09-18 DIAGNOSIS — M54.12 RADICULOPATHY, CERVICAL REGION: ICD-10-CM

## 2024-09-18 PROCEDURE — 99212 OFFICE O/P EST SF 10 MIN: CPT | Mod: ACP

## 2024-09-18 NOTE — REASON FOR VISIT
[Home] : at home, [unfilled] , at the time of the visit. [Medical Office: ([facility])___] : at the medical office located in  [Patient] : the patient [Self] : self [Follow-Up Visit] : a follow-up pain management visit

## 2024-09-18 NOTE — DISCUSSION/SUMMARY
[Medication Risks Reviewed] : Medication risks reviewed [de-identified] : Prescriptions renewed. [place] and [place]. SOAPP-R completed on chart. The patient's medications are documented to the best of their ability. Quality of life and functional ability improved on medications. The patient is showing no aberrant behavior or evidence of diversion. The patient was advised not to use narcotic medication while operating an automobile or heavy machinery due to potential sedation or dizziness. The patient was educated to the risks associated with potential opioid dependence and addiction. Urine toxicology screens as per office protocol. Use of multimodal analgesia used prn. Follow up one month.

## 2024-09-18 NOTE — HISTORY OF PRESENT ILLNESS
[Neck] : neck [Upper back] : upper back [Mid-back] : mid-back [Lower back] : lower back [Work related] : work related [10] : 10 [9] : 9 [Dull/Aching] : dull/aching [Sharp] : sharp [Throbbing] : throbbing [Tightness] : tightness [Constant] : constant [Sleep] : sleep [Meds] : meds [Nothing helps with pain getting better] : Nothing helps with pain getting better [Sitting] : sitting [Standing] : standing [Walking] : walking [Bending forward] : bending forward [Not working due to injury] : Work status: not working due to injury [FreeTextEntry1] : Chronic neck and back pain stable.Manages well on meds. Last UDT 8- and consistent with prescribed medication.  Maintains a functional ADL. [] : This patient has had an injection before: no [FreeTextEntry3] : 10.10.08 [FreeTextEntry6] : SORENESS - PT EXTRA SORE DUE TO NEW CHOLESTEROL MEDICATION SIDE EFFECTS

## 2024-10-16 ENCOUNTER — APPOINTMENT (OUTPATIENT)
Dept: PAIN MANAGEMENT | Facility: CLINIC | Age: 74
End: 2024-10-16
Payer: OTHER MISCELLANEOUS

## 2024-10-16 DIAGNOSIS — M54.12 RADICULOPATHY, CERVICAL REGION: ICD-10-CM

## 2024-10-16 DIAGNOSIS — M54.16 RADICULOPATHY, LUMBAR REGION: ICD-10-CM

## 2024-10-16 PROCEDURE — 99212 OFFICE O/P EST SF 10 MIN: CPT | Mod: ACP

## 2024-10-18 ENCOUNTER — APPOINTMENT (OUTPATIENT)
Dept: UROLOGY | Facility: CLINIC | Age: 74
End: 2024-10-18

## 2024-11-13 ENCOUNTER — APPOINTMENT (OUTPATIENT)
Dept: PAIN MANAGEMENT | Facility: CLINIC | Age: 74
End: 2024-11-13
Payer: OTHER MISCELLANEOUS

## 2024-11-13 DIAGNOSIS — M54.12 RADICULOPATHY, CERVICAL REGION: ICD-10-CM

## 2024-11-13 DIAGNOSIS — M54.16 RADICULOPATHY, LUMBAR REGION: ICD-10-CM

## 2024-11-13 PROCEDURE — 99212 OFFICE O/P EST SF 10 MIN: CPT | Mod: ACP

## 2024-12-06 DIAGNOSIS — J06.9 ACUTE UPPER RESPIRATORY INFECTION, UNSPECIFIED: ICD-10-CM

## 2024-12-06 RX ORDER — AMOXICILLIN AND CLAVULANATE POTASSIUM 875; 125 MG/1; MG/1
875-125 TABLET, COATED ORAL TWICE DAILY
Qty: 14 | Refills: 0 | Status: ACTIVE | COMMUNITY
Start: 2024-12-06 | End: 1900-01-01

## 2024-12-17 ENCOUNTER — APPOINTMENT (OUTPATIENT)
Dept: PAIN MANAGEMENT | Facility: CLINIC | Age: 74
End: 2024-12-17
Payer: OTHER MISCELLANEOUS

## 2024-12-17 VITALS — WEIGHT: 156 LBS | HEIGHT: 65 IN | BODY MASS INDEX: 25.99 KG/M2

## 2024-12-17 DIAGNOSIS — M54.12 RADICULOPATHY, CERVICAL REGION: ICD-10-CM

## 2024-12-17 DIAGNOSIS — M54.16 RADICULOPATHY, LUMBAR REGION: ICD-10-CM

## 2024-12-17 PROCEDURE — 99213 OFFICE O/P EST LOW 20 MIN: CPT | Mod: ACP

## 2025-01-15 ENCOUNTER — APPOINTMENT (OUTPATIENT)
Dept: PAIN MANAGEMENT | Facility: CLINIC | Age: 75
End: 2025-01-15
Payer: OTHER MISCELLANEOUS

## 2025-01-15 DIAGNOSIS — M54.12 RADICULOPATHY, CERVICAL REGION: ICD-10-CM

## 2025-01-15 DIAGNOSIS — M54.16 RADICULOPATHY, LUMBAR REGION: ICD-10-CM

## 2025-01-15 PROCEDURE — 99212 OFFICE O/P EST SF 10 MIN: CPT | Mod: ACP

## 2025-02-14 ENCOUNTER — APPOINTMENT (OUTPATIENT)
Dept: PAIN MANAGEMENT | Facility: CLINIC | Age: 75
End: 2025-02-14
Payer: OTHER MISCELLANEOUS

## 2025-02-14 DIAGNOSIS — M54.16 RADICULOPATHY, LUMBAR REGION: ICD-10-CM

## 2025-02-14 DIAGNOSIS — M54.12 RADICULOPATHY, CERVICAL REGION: ICD-10-CM

## 2025-02-14 PROCEDURE — 99212 OFFICE O/P EST SF 10 MIN: CPT | Mod: ACP,95

## 2025-03-14 ENCOUNTER — APPOINTMENT (OUTPATIENT)
Dept: PAIN MANAGEMENT | Facility: CLINIC | Age: 75
End: 2025-03-14
Payer: OTHER MISCELLANEOUS

## 2025-03-14 DIAGNOSIS — M54.16 RADICULOPATHY, LUMBAR REGION: ICD-10-CM

## 2025-03-14 DIAGNOSIS — M54.12 RADICULOPATHY, CERVICAL REGION: ICD-10-CM

## 2025-03-14 PROCEDURE — 99212 OFFICE O/P EST SF 10 MIN: CPT | Mod: ACP,95

## 2025-04-15 ENCOUNTER — APPOINTMENT (OUTPATIENT)
Dept: PAIN MANAGEMENT | Facility: CLINIC | Age: 75
End: 2025-04-15
Payer: OTHER MISCELLANEOUS

## 2025-04-15 VITALS — HEIGHT: 65 IN | BODY MASS INDEX: 25.99 KG/M2 | WEIGHT: 156 LBS

## 2025-04-15 DIAGNOSIS — M54.12 RADICULOPATHY, CERVICAL REGION: ICD-10-CM

## 2025-04-15 DIAGNOSIS — M54.16 RADICULOPATHY, LUMBAR REGION: ICD-10-CM

## 2025-04-15 PROCEDURE — 99212 OFFICE O/P EST SF 10 MIN: CPT | Mod: ACP

## 2025-05-14 ENCOUNTER — APPOINTMENT (OUTPATIENT)
Dept: PAIN MANAGEMENT | Facility: CLINIC | Age: 75
End: 2025-05-14
Payer: OTHER MISCELLANEOUS

## 2025-05-14 DIAGNOSIS — M54.16 RADICULOPATHY, LUMBAR REGION: ICD-10-CM

## 2025-05-14 DIAGNOSIS — M54.12 RADICULOPATHY, CERVICAL REGION: ICD-10-CM

## 2025-05-14 PROCEDURE — 99212 OFFICE O/P EST SF 10 MIN: CPT | Mod: ACP,95

## 2025-06-23 ENCOUNTER — APPOINTMENT (OUTPATIENT)
Dept: PAIN MANAGEMENT | Facility: CLINIC | Age: 75
End: 2025-06-23
Payer: OTHER MISCELLANEOUS

## 2025-06-23 PROCEDURE — 99212 OFFICE O/P EST SF 10 MIN: CPT | Mod: ACP,95

## 2025-07-21 ENCOUNTER — APPOINTMENT (OUTPATIENT)
Dept: PAIN MANAGEMENT | Facility: CLINIC | Age: 75
End: 2025-07-21
Payer: OTHER MISCELLANEOUS

## 2025-07-21 DIAGNOSIS — G89.4 CHRONIC PAIN SYNDROME: ICD-10-CM

## 2025-07-21 DIAGNOSIS — M54.12 RADICULOPATHY, CERVICAL REGION: ICD-10-CM

## 2025-07-21 PROCEDURE — 99212 OFFICE O/P EST SF 10 MIN: CPT | Mod: ACP,95

## 2025-08-18 ENCOUNTER — RX RENEWAL (OUTPATIENT)
Age: 75
End: 2025-08-18

## 2025-08-19 ENCOUNTER — APPOINTMENT (OUTPATIENT)
Dept: PAIN MANAGEMENT | Facility: CLINIC | Age: 75
End: 2025-08-19
Payer: OTHER MISCELLANEOUS

## 2025-08-19 VITALS — HEIGHT: 65 IN | BODY MASS INDEX: 25.99 KG/M2 | WEIGHT: 156 LBS

## 2025-08-19 DIAGNOSIS — M54.12 RADICULOPATHY, CERVICAL REGION: ICD-10-CM

## 2025-08-19 DIAGNOSIS — M54.16 RADICULOPATHY, LUMBAR REGION: ICD-10-CM

## 2025-08-19 PROCEDURE — 99212 OFFICE O/P EST SF 10 MIN: CPT | Mod: ACP

## 2025-08-23 ENCOUNTER — RX RENEWAL (OUTPATIENT)
Age: 75
End: 2025-08-23

## 2025-09-17 ENCOUNTER — APPOINTMENT (OUTPATIENT)
Dept: PAIN MANAGEMENT | Facility: CLINIC | Age: 75
End: 2025-09-17
Payer: OTHER MISCELLANEOUS

## 2025-09-17 DIAGNOSIS — G89.4 CHRONIC PAIN SYNDROME: ICD-10-CM

## 2025-09-17 DIAGNOSIS — M54.12 RADICULOPATHY, CERVICAL REGION: ICD-10-CM

## 2025-09-17 PROCEDURE — 99212 OFFICE O/P EST SF 10 MIN: CPT | Mod: ACP,95
